# Patient Record
Sex: FEMALE | Race: WHITE | Employment: FULL TIME | URBAN - METROPOLITAN AREA
[De-identification: names, ages, dates, MRNs, and addresses within clinical notes are randomized per-mention and may not be internally consistent; named-entity substitution may affect disease eponyms.]

---

## 2017-01-18 ENCOUNTER — OFFICE VISIT (OUTPATIENT)
Dept: HEMATOLOGY/ONCOLOGY | Facility: HOSPITAL | Age: 57
End: 2017-01-18
Attending: OBSTETRICS & GYNECOLOGY
Payer: COMMERCIAL

## 2017-01-18 VITALS
HEART RATE: 81 BPM | WEIGHT: 231 LBS | RESPIRATION RATE: 20 BRPM | BODY MASS INDEX: 37.12 KG/M2 | TEMPERATURE: 98 F | SYSTOLIC BLOOD PRESSURE: 134 MMHG | DIASTOLIC BLOOD PRESSURE: 72 MMHG | HEIGHT: 65.98 IN

## 2017-01-18 DIAGNOSIS — C53.9 MALIGNANT NEOPLASM OF CERVIX, UNSPECIFIED SITE (HCC): Primary | ICD-10-CM

## 2017-01-18 PROCEDURE — 99211 OFF/OP EST MAY X REQ PHY/QHP: CPT

## 2017-01-18 NOTE — PROGRESS NOTES
CANCER CENTER PROGRESS NOTE     HISTORY OF PRESENT ILLNESS: The patient is a woman with stage IIB cervical carcinoma diagnosed 6/12.  She began receiving radiation with chemotherapy and did not tolerate the chemotherapy at all, only receiving 2 doses and ne

## 2017-12-20 ENCOUNTER — OFFICE VISIT (OUTPATIENT)
Dept: HEMATOLOGY/ONCOLOGY | Facility: HOSPITAL | Age: 57
End: 2017-12-20
Attending: OBSTETRICS & GYNECOLOGY
Payer: COMMERCIAL

## 2017-12-20 VITALS
HEART RATE: 73 BPM | RESPIRATION RATE: 18 BRPM | OXYGEN SATURATION: 94 % | TEMPERATURE: 98 F | WEIGHT: 230.81 LBS | DIASTOLIC BLOOD PRESSURE: 76 MMHG | HEIGHT: 66 IN | BODY MASS INDEX: 37.09 KG/M2 | SYSTOLIC BLOOD PRESSURE: 128 MMHG

## 2017-12-20 DIAGNOSIS — C53.9 MALIGNANT NEOPLASM OF CERVIX, UNSPECIFIED SITE (HCC): Primary | ICD-10-CM

## 2017-12-20 PROCEDURE — 99211 OFF/OP EST MAY X REQ PHY/QHP: CPT

## 2017-12-20 PROCEDURE — 88175 CYTOPATH C/V AUTO FLUID REDO: CPT | Performed by: OBSTETRICS & GYNECOLOGY

## 2018-07-19 ENCOUNTER — SOCIAL WORK SERVICES (OUTPATIENT)
Dept: HEMATOLOGY/ONCOLOGY | Facility: HOSPITAL | Age: 58
End: 2018-07-19

## 2018-07-19 NOTE — PROGRESS NOTES
AMANDA received call from patient's daughter, Zheng Troy, requesting letter for patient's insurance.    AMANDA prepared letter and emailed it to Jose Martinez at [email or web address].

## 2019-01-16 ENCOUNTER — APPOINTMENT (OUTPATIENT)
Dept: HEMATOLOGY/ONCOLOGY | Facility: HOSPITAL | Age: 59
End: 2019-01-16
Attending: OBSTETRICS & GYNECOLOGY
Payer: COMMERCIAL

## 2019-02-20 ENCOUNTER — OFFICE VISIT (OUTPATIENT)
Dept: HEMATOLOGY/ONCOLOGY | Facility: HOSPITAL | Age: 59
End: 2019-02-20
Attending: OBSTETRICS & GYNECOLOGY
Payer: COMMERCIAL

## 2019-02-20 VITALS
BODY MASS INDEX: 35.55 KG/M2 | DIASTOLIC BLOOD PRESSURE: 81 MMHG | SYSTOLIC BLOOD PRESSURE: 129 MMHG | OXYGEN SATURATION: 98 % | RESPIRATION RATE: 16 BRPM | HEART RATE: 77 BPM | WEIGHT: 221.19 LBS | TEMPERATURE: 98 F | HEIGHT: 66 IN

## 2019-02-20 DIAGNOSIS — C53.9 MALIGNANT NEOPLASM OF CERVIX, UNSPECIFIED SITE (HCC): Primary | ICD-10-CM

## 2019-02-20 PROCEDURE — 88175 CYTOPATH C/V AUTO FLUID REDO: CPT | Performed by: OBSTETRICS & GYNECOLOGY

## 2019-02-20 PROCEDURE — 99211 OFF/OP EST MAY X REQ PHY/QHP: CPT

## 2019-02-20 RX ORDER — ESTRADIOL 10 UG/1
INSERT VAGINAL
Qty: 20 TABLET | Refills: 2 | Status: SHIPPED | OUTPATIENT
Start: 2019-02-20 | End: 2019-08-21

## 2019-02-20 NOTE — PROGRESS NOTES
Patient is here for Dr Dorcas Vee one year follow up for cervical cancer. Patient c/o vaginal dryness and would like to discuss restarting Vagifem. Denies vaginal bleeding or discharge. Denies pain.        Education Record    Learner:  Patient    Disease / [place]

## 2019-02-27 ENCOUNTER — TELEPHONE (OUTPATIENT)
Dept: HEMATOLOGY/ONCOLOGY | Facility: HOSPITAL | Age: 59
End: 2019-02-27

## 2019-02-27 NOTE — TELEPHONE ENCOUNTER
Patient returned call and notified of pap smear results.  Scheduled f/u with Dr Naomi Meza in August.

## 2019-03-20 ENCOUNTER — HOSPITAL ENCOUNTER (EMERGENCY)
Age: 59
Discharge: HOME | End: 2019-03-20
Attending: EMERGENCY MEDICINE
Payer: COMMERCIAL

## 2019-03-20 VITALS
RESPIRATION RATE: 20 BRPM | DIASTOLIC BLOOD PRESSURE: 74 MMHG | SYSTOLIC BLOOD PRESSURE: 149 MMHG | HEIGHT: 66 IN | OXYGEN SATURATION: 96 % | WEIGHT: 198 LBS | TEMPERATURE: 100 F | BODY MASS INDEX: 31.82 KG/M2 | HEART RATE: 71 BPM

## 2019-03-20 DIAGNOSIS — J11.1 INFLUENZA: Primary | ICD-10-CM

## 2019-03-20 LAB
ALBUMIN SERPL-MCNC: 3.7 G/DL (ref 3.4–5)
ALBUMIN/GLOB SERPL: 1 {RATIO} (ref 1–2)
ALP LIVER SERPL-CCNC: 84 U/L (ref 46–118)
ALT SERPL-CCNC: 25 U/L (ref 13–56)
ANION GAP SERPL CALC-SCNC: 8 MMOL/L (ref 0–18)
AST SERPL-CCNC: 20 U/L (ref 15–37)
BASOPHILS # BLD AUTO: 0.01 X10(3) UL (ref 0–0.2)
BASOPHILS NFR BLD AUTO: 0.2 %
BILIRUB SERPL-MCNC: 0.8 MG/DL (ref 0.1–2)
BUN BLD-MCNC: 15 MG/DL (ref 7–18)
BUN/CREAT SERPL: 19 (ref 10–20)
CALCIUM BLD-MCNC: 8.7 MG/DL (ref 8.5–10.1)
CHLORIDE SERPL-SCNC: 103 MMOL/L (ref 98–107)
CLARITY UR REFRACT.AUTO: CLEAR
CO2 SERPL-SCNC: 26 MMOL/L (ref 21–32)
COLOR UR AUTO: YELLOW
CREAT BLD-MCNC: 0.79 MG/DL (ref 0.55–1.02)
DEPRECATED RDW RBC AUTO: 42.8 FL (ref 35.1–46.3)
EOSINOPHIL # BLD AUTO: 0 X10(3) UL (ref 0–0.7)
EOSINOPHIL NFR BLD AUTO: 0 %
ERYTHROCYTE [DISTWIDTH] IN BLOOD BY AUTOMATED COUNT: 13 % (ref 11–15)
GLOBULIN PLAS-MCNC: 3.8 G/DL (ref 2.8–4.4)
GLUCOSE BLD-MCNC: 124 MG/DL (ref 70–99)
GLUCOSE UR STRIP.AUTO-MCNC: NEGATIVE MG/DL
HCT VFR BLD AUTO: 43 % (ref 35–48)
HGB BLD-MCNC: 13.8 G/DL (ref 12–16)
IMM GRANULOCYTES # BLD AUTO: 0.02 X10(3) UL (ref 0–1)
IMM GRANULOCYTES NFR BLD: 0.3 %
KETONES UR STRIP.AUTO-MCNC: 80 MG/DL
LEUKOCYTE ESTERASE UR QL STRIP.AUTO: NEGATIVE
LYMPHOCYTES # BLD AUTO: 0.38 X10(3) UL (ref 1–4)
LYMPHOCYTES NFR BLD AUTO: 6.4 %
M PROTEIN MFR SERPL ELPH: 7.5 G/DL (ref 6.4–8.2)
MCH RBC QN AUTO: 29.1 PG (ref 26–34)
MCHC RBC AUTO-ENTMCNC: 32.1 G/DL (ref 31–37)
MCV RBC AUTO: 90.7 FL (ref 80–100)
MONOCYTES # BLD AUTO: 0.68 X10(3) UL (ref 0.1–1)
MONOCYTES NFR BLD AUTO: 11.5 %
NEUTROPHILS # BLD AUTO: 4.83 X10 (3) UL (ref 1.5–7.7)
NEUTROPHILS # BLD AUTO: 4.83 X10(3) UL (ref 1.5–7.7)
NEUTROPHILS NFR BLD AUTO: 81.6 %
NITRITE UR QL STRIP.AUTO: NEGATIVE
OSMOLALITY SERPL CALC.SUM OF ELEC: 286 MOSM/KG (ref 275–295)
PH UR STRIP.AUTO: 6 [PH] (ref 4.5–8)
PLATELET # BLD AUTO: 203 10(3)UL (ref 150–450)
POCT INFLUENZA A: POSITIVE
POCT INFLUENZA B: NEGATIVE
POTASSIUM SERPL-SCNC: 4.2 MMOL/L (ref 3.5–5.1)
RBC # BLD AUTO: 4.74 X10(6)UL (ref 3.8–5.3)
SODIUM SERPL-SCNC: 137 MMOL/L (ref 136–145)
SP GR UR STRIP.AUTO: >=1.03 (ref 1–1.03)
UROBILINOGEN UR STRIP.AUTO-MCNC: 0.2 MG/DL
WBC # BLD AUTO: 5.9 X10(3) UL (ref 4–11)

## 2019-03-20 PROCEDURE — 85025 COMPLETE CBC W/AUTO DIFF WBC: CPT | Performed by: EMERGENCY MEDICINE

## 2019-03-20 PROCEDURE — 96375 TX/PRO/DX INJ NEW DRUG ADDON: CPT | Performed by: EMERGENCY MEDICINE

## 2019-03-20 PROCEDURE — 99284 EMERGENCY DEPT VISIT MOD MDM: CPT | Performed by: EMERGENCY MEDICINE

## 2019-03-20 PROCEDURE — 96374 THER/PROPH/DIAG INJ IV PUSH: CPT | Performed by: EMERGENCY MEDICINE

## 2019-03-20 PROCEDURE — 87502 INFLUENZA DNA AMP PROBE: CPT | Performed by: EMERGENCY MEDICINE

## 2019-03-20 PROCEDURE — 96361 HYDRATE IV INFUSION ADD-ON: CPT | Performed by: EMERGENCY MEDICINE

## 2019-03-20 PROCEDURE — 81001 URINALYSIS AUTO W/SCOPE: CPT | Performed by: EMERGENCY MEDICINE

## 2019-03-20 PROCEDURE — 80053 COMPREHEN METABOLIC PANEL: CPT | Performed by: EMERGENCY MEDICINE

## 2019-03-20 RX ORDER — ESOMEPRAZOLE MAGNESIUM 40 MG/1
40 CAPSULE, DELAYED RELEASE ORAL
COMMUNITY

## 2019-03-20 RX ORDER — ONDANSETRON 2 MG/ML
4 INJECTION INTRAMUSCULAR; INTRAVENOUS ONCE
Status: COMPLETED | OUTPATIENT
Start: 2019-03-20 | End: 2019-03-20

## 2019-03-20 RX ORDER — ACETAMINOPHEN 160 MG/5ML
1000 SOLUTION ORAL ONCE
Status: COMPLETED | OUTPATIENT
Start: 2019-03-20 | End: 2019-03-20

## 2019-03-20 RX ORDER — ONDANSETRON 4 MG/1
4 TABLET, ORALLY DISINTEGRATING ORAL EVERY 4 HOURS PRN
Qty: 10 TABLET | Refills: 0 | Status: SHIPPED | OUTPATIENT
Start: 2019-03-20 | End: 2019-03-27

## 2019-03-20 NOTE — ED INITIAL ASSESSMENT (HPI)
Pt c/o cough/bodyaches/headache since Monday. Sts she had a few episodes of vomiting on Monday and once yesterday and 2 episodes of diarrhea. Denies fever.

## 2019-03-20 NOTE — ED PROVIDER NOTES
Patient Seen in: THE [facility] Emergency Department In [place]    History   Patient presents with:  Nausea/Vomiting/Diarrhea (gastrointestinal)  Abdomen/Flank Pain (GI/)  Headache (neurologic)    Stated Complaint: [facility] Physical Exam    Vital signs reviewed  General appearance: Patient is alert and in no acute distress  HEENT: Pupils equal react to light extraocular muscles intact no scleral icterus, mucous membranes are moist, there is no erythema or exudate in t Motrin Tylenol and drink plenty of fluids. Make sure limiting exposure to other individuals. Return if any worsening problem. Instructed next year to make sure he gets the flu shot. Also told patient antibiotics would be of no use for this.   Patient is

## 2019-08-21 ENCOUNTER — OFFICE VISIT (OUTPATIENT)
Dept: HEMATOLOGY/ONCOLOGY | Facility: HOSPITAL | Age: 59
End: 2019-08-21
Attending: OBSTETRICS & GYNECOLOGY
Payer: COMMERCIAL

## 2019-08-21 VITALS
WEIGHT: 221.81 LBS | HEART RATE: 79 BPM | HEIGHT: 65.98 IN | DIASTOLIC BLOOD PRESSURE: 82 MMHG | SYSTOLIC BLOOD PRESSURE: 139 MMHG | TEMPERATURE: 99 F | RESPIRATION RATE: 16 BRPM | BODY MASS INDEX: 35.65 KG/M2 | OXYGEN SATURATION: 95 %

## 2019-08-21 DIAGNOSIS — C53.9 MALIGNANT NEOPLASM OF CERVIX, UNSPECIFIED SITE (HCC): Primary | ICD-10-CM

## 2019-08-21 PROCEDURE — 88175 CYTOPATH C/V AUTO FLUID REDO: CPT | Performed by: OBSTETRICS & GYNECOLOGY

## 2019-08-21 PROCEDURE — 87624 HPV HI-RISK TYP POOLED RSLT: CPT | Performed by: OBSTETRICS & GYNECOLOGY

## 2019-08-21 RX ORDER — ESTRADIOL 10 UG/1
INSERT VAGINAL
Qty: 20 TABLET | Refills: 3 | Status: SHIPPED | OUTPATIENT
Start: 2019-08-21 | End: 2021-12-22

## 2019-08-21 NOTE — PROGRESS NOTES
Patient is here for Dr Deniz Rinaldi 6 month f/u for cervical cancer. Patient is feeling well. Denies any vaginal bleeding or discharge. No complaints.        Education Record    Learner:  Patient    Disease / Diagnosis:  Cervical cancer     Barriers / Limitations

## 2019-08-26 LAB — HPV I/H RISK 1 DNA SPEC QL NAA+PROBE: NEGATIVE

## 2019-09-09 ENCOUNTER — APPOINTMENT (OUTPATIENT)
Dept: GENERAL RADIOLOGY | Age: 59
End: 2019-09-09
Attending: FAMILY MEDICINE
Payer: COMMERCIAL

## 2019-09-09 ENCOUNTER — HOSPITAL ENCOUNTER (OUTPATIENT)
Age: 59
Discharge: HOME | End: 2019-09-09
Attending: FAMILY MEDICINE
Payer: COMMERCIAL

## 2019-09-09 VITALS
TEMPERATURE: 99 F | DIASTOLIC BLOOD PRESSURE: 83 MMHG | SYSTOLIC BLOOD PRESSURE: 147 MMHG | RESPIRATION RATE: 18 BRPM | OXYGEN SATURATION: 97 % | HEART RATE: 88 BPM

## 2019-09-09 DIAGNOSIS — S63.501A SPRAIN OF RIGHT WRIST, INITIAL ENCOUNTER: ICD-10-CM

## 2019-09-09 DIAGNOSIS — S60.00XA CONTUSION OF FINGER WITHOUT DAMAGE TO NAIL, UNSPECIFIED FINGER, INITIAL ENCOUNTER: Primary | ICD-10-CM

## 2019-09-09 PROCEDURE — 99202 OFFICE O/P NEW SF 15 MIN: CPT

## 2019-09-09 PROCEDURE — 73130 X-RAY EXAM OF HAND: CPT | Performed by: FAMILY MEDICINE

## 2019-09-09 PROCEDURE — 73110 X-RAY EXAM OF WRIST: CPT | Performed by: FAMILY MEDICINE

## 2019-09-09 PROCEDURE — 99213 OFFICE O/P EST LOW 20 MIN: CPT

## 2019-09-10 ENCOUNTER — TELEPHONE (OUTPATIENT)
Dept: HEMATOLOGY/ONCOLOGY | Facility: HOSPITAL | Age: 59
End: 2019-09-10

## 2019-09-10 NOTE — ED INITIAL ASSESSMENT (HPI)
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

## 2019-09-10 NOTE — ED PROVIDER NOTES
Patient Seen in: [facility]    History   Patient presents with:  Upper Extremity Injury (musculoskeletal)    Stated Complaint: FALL AT WORK--WRIST/HAND PAIN     HPI    78-year-old female presents today for evaluation of right wr in no acute distress   examination of the right wrist there is no focal tenderness to the wrist normal range of motion. There is mild bruising, tenderness to the proximal fourth digit otherwise full range of motion good handgrip.   Intact sensations, stren with a right wrist sprain. X-ray shows no acute fracture or dislocation. Conservative management. Dale taping applied for comfort measures. Ibuprofen for pain. Follow PCP accordingly.               Disposition and Plan     Clinical Impression:  Contus

## 2019-10-19 ENCOUNTER — HOSPITAL ENCOUNTER (OUTPATIENT)
Age: 59
Discharge: HOME | End: 2019-10-19
Payer: COMMERCIAL

## 2019-10-19 VITALS
HEIGHT: 66 IN | DIASTOLIC BLOOD PRESSURE: 66 MMHG | SYSTOLIC BLOOD PRESSURE: 137 MMHG | OXYGEN SATURATION: 98 % | RESPIRATION RATE: 16 BRPM | WEIGHT: 190 LBS | TEMPERATURE: 98 F | BODY MASS INDEX: 30.53 KG/M2 | HEART RATE: 61 BPM

## 2019-10-19 DIAGNOSIS — R11.2 NAUSEA VOMITING AND DIARRHEA: Primary | ICD-10-CM

## 2019-10-19 DIAGNOSIS — E86.0 DEHYDRATION: ICD-10-CM

## 2019-10-19 DIAGNOSIS — R19.7 NAUSEA VOMITING AND DIARRHEA: Primary | ICD-10-CM

## 2019-10-19 PROCEDURE — 80047 BASIC METABLC PNL IONIZED CA: CPT

## 2019-10-19 PROCEDURE — 96361 HYDRATE IV INFUSION ADD-ON: CPT

## 2019-10-19 PROCEDURE — 96374 THER/PROPH/DIAG INJ IV PUSH: CPT

## 2019-10-19 PROCEDURE — 81002 URINALYSIS NONAUTO W/O SCOPE: CPT | Performed by: PHYSICIAN ASSISTANT

## 2019-10-19 PROCEDURE — 96375 TX/PRO/DX INJ NEW DRUG ADDON: CPT

## 2019-10-19 PROCEDURE — 99214 OFFICE O/P EST MOD 30 MIN: CPT

## 2019-10-19 PROCEDURE — 99215 OFFICE O/P EST HI 40 MIN: CPT

## 2019-10-19 PROCEDURE — 85025 COMPLETE CBC W/AUTO DIFF WBC: CPT | Performed by: PHYSICIAN ASSISTANT

## 2019-10-19 RX ORDER — KETOROLAC TROMETHAMINE 30 MG/ML
15 INJECTION, SOLUTION INTRAMUSCULAR; INTRAVENOUS ONCE
Status: COMPLETED | OUTPATIENT
Start: 2019-10-19 | End: 2019-10-19

## 2019-10-19 RX ORDER — SODIUM CHLORIDE 9 MG/ML
1000 INJECTION, SOLUTION INTRAVENOUS ONCE
Status: COMPLETED | OUTPATIENT
Start: 2019-10-19 | End: 2019-10-19

## 2019-10-19 RX ORDER — ONDANSETRON 2 MG/ML
4 INJECTION INTRAMUSCULAR; INTRAVENOUS ONCE
Status: COMPLETED | OUTPATIENT
Start: 2019-10-19 | End: 2019-10-19

## 2019-10-19 NOTE — ED PROVIDER NOTES
Patient Seen in: [facility]      History   Patient presents with:  Nausea/Vomiting/Diarrhea (gastrointestinal)    Stated Complaint: FLU LIKE SYMPTOMS    HPI    CHIEF COMPLAINT: Nausea vomiting, fevers, chills, flulike symptoms reviewed and is noncontributory to the presenting problem, except as indicated as above.     Past Medical History:   Diagnosis Date   • Anxiety state, unspecified    • Cancer of cervix ([facility] [address]    • Esophageal reflux               Past Surgical History:   Proc tenderness. Nose: Inferior turbinates are swollen, boggy with clear rhinorrhea. No foreign bodies or polyps. Respiratory: there are no retractions, lungs are clear to auscultation bilaterally.   Cardiovascular: regular rate and rhythm, normal S1-S2  Abdo diagnosis and need for followup with their PCP in 2 days for further evaluation and care, but to return immediately to the ER for worsening, concerning, new, changing or persisting symptoms. Patient was screened and evaluated during this visit.    I dete

## 2019-10-19 NOTE — ED INITIAL ASSESSMENT (HPI)
Pt has had nausea vomiting and diarrhea with a headache and body aches for 3 days, after getting her flu shot

## 2020-01-15 ENCOUNTER — OFFICE VISIT (OUTPATIENT)
Dept: HEMATOLOGY/ONCOLOGY | Facility: HOSPITAL | Age: 60
End: 2020-01-15
Attending: OBSTETRICS & GYNECOLOGY
Payer: COMMERCIAL

## 2020-01-15 VITALS
RESPIRATION RATE: 16 BRPM | DIASTOLIC BLOOD PRESSURE: 81 MMHG | SYSTOLIC BLOOD PRESSURE: 136 MMHG | WEIGHT: 219 LBS | HEIGHT: 65.98 IN | HEART RATE: 73 BPM | BODY MASS INDEX: 35.2 KG/M2 | OXYGEN SATURATION: 98 % | TEMPERATURE: 98 F

## 2020-01-15 DIAGNOSIS — C53.9 MALIGNANT NEOPLASM OF CERVIX, UNSPECIFIED SITE (HCC): Primary | ICD-10-CM

## 2020-01-15 PROCEDURE — 88175 CYTOPATH C/V AUTO FLUID REDO: CPT | Performed by: OBSTETRICS & GYNECOLOGY

## 2020-01-15 PROCEDURE — 99211 OFF/OP EST MAY X REQ PHY/QHP: CPT

## 2020-01-15 NOTE — PROGRESS NOTES
Patient is here for Dr Owen Schaffer f/u for cervical cancer. She is feeling well. Denies vaginal discharge or bleeding. No complaints.        Education Record    Learner:  Patient    Disease / Diagnosis:  Cervical cancer     Barriers / Limitations:  None   Commen

## 2020-01-21 ENCOUNTER — TELEPHONE (OUTPATIENT)
Dept: HEMATOLOGY/ONCOLOGY | Facility: HOSPITAL | Age: 60
End: 2020-01-21

## 2020-01-21 NOTE — TELEPHONE ENCOUNTER
Test(s) completed: Pap smear   Results: per Dr Tripp Ramos, repeat in 6 months. Plan: called patient, no answer. Left message to repeat in 6 months.

## 2020-02-19 ENCOUNTER — APPOINTMENT (OUTPATIENT)
Dept: HEMATOLOGY/ONCOLOGY | Facility: HOSPITAL | Age: 60
End: 2020-02-19
Attending: OBSTETRICS & GYNECOLOGY
Payer: COMMERCIAL

## 2020-06-09 NOTE — PROGRESS NOTES
CANCER CENTER PROGRESS NOTE     HISTORY OF PRESENT ILLNESS: The patient is a woman with stage IIB cervical carcinoma diagnosed 6/12.  She began receiving radiation with chemotherapy and did not tolerate the chemotherapy at all, only receiving 2 doses and ne no

## 2020-12-16 ENCOUNTER — OFFICE VISIT (OUTPATIENT)
Dept: HEMATOLOGY/ONCOLOGY | Facility: HOSPITAL | Age: 60
End: 2020-12-16
Attending: OBSTETRICS & GYNECOLOGY
Payer: COMMERCIAL

## 2020-12-16 VITALS
SYSTOLIC BLOOD PRESSURE: 118 MMHG | DIASTOLIC BLOOD PRESSURE: 72 MMHG | HEART RATE: 72 BPM | WEIGHT: 224.19 LBS | RESPIRATION RATE: 16 BRPM | BODY MASS INDEX: 36 KG/M2 | OXYGEN SATURATION: 96 % | TEMPERATURE: 98 F

## 2020-12-16 DIAGNOSIS — C53.9 MALIGNANT NEOPLASM OF CERVIX, UNSPECIFIED SITE (HCC): Primary | ICD-10-CM

## 2020-12-16 PROCEDURE — 87624 HPV HI-RISK TYP POOLED RSLT: CPT | Performed by: OBSTETRICS & GYNECOLOGY

## 2020-12-16 PROCEDURE — 88175 CYTOPATH C/V AUTO FLUID REDO: CPT | Performed by: OBSTETRICS & GYNECOLOGY

## 2020-12-16 PROCEDURE — 99211 OFF/OP EST MAY X REQ PHY/QHP: CPT

## 2020-12-16 NOTE — PROGRESS NOTES
Patient is here for Dr Farhat Gonzales one year follow up for cervical cancer. Patient denies any vaginal bleeding or discharge. Feeling well. Denies any complaints.        Education Record    Learner:  Patient    Disease / Diagnosis:  Cervical cancer     Barriers /

## 2021-12-22 ENCOUNTER — OFFICE VISIT (OUTPATIENT)
Dept: HEMATOLOGY/ONCOLOGY | Facility: HOSPITAL | Age: 61
End: 2021-12-22
Attending: OBSTETRICS & GYNECOLOGY
Payer: COMMERCIAL

## 2021-12-22 VITALS
HEART RATE: 76 BPM | OXYGEN SATURATION: 97 % | WEIGHT: 226 LBS | RESPIRATION RATE: 16 BRPM | SYSTOLIC BLOOD PRESSURE: 122 MMHG | DIASTOLIC BLOOD PRESSURE: 80 MMHG | BODY MASS INDEX: 37 KG/M2

## 2021-12-22 DIAGNOSIS — C53.9 MALIGNANT NEOPLASM OF CERVIX, UNSPECIFIED SITE (HCC): Primary | ICD-10-CM

## 2021-12-22 PROCEDURE — 99211 OFF/OP EST MAY X REQ PHY/QHP: CPT

## 2021-12-22 PROCEDURE — 88175 CYTOPATH C/V AUTO FLUID REDO: CPT | Performed by: OBSTETRICS & GYNECOLOGY

## 2021-12-22 RX ORDER — ESTRADIOL 10 UG/1
INSERT VAGINAL
Qty: 24 TABLET | Refills: 3 | Status: SHIPPED | OUTPATIENT
Start: 2021-12-22

## 2021-12-22 NOTE — PROGRESS NOTES
Patient is here for MD one year follow up for Cervical Cancer. Feeling well. No vaginal discharge or bleeding. Needs Vagifem refill today.        Education Record    Learner:  Patient    Disease / Diagnosis: Cervical cancer     Barriers / Limitations:  None

## 2022-01-04 ENCOUNTER — TELEPHONE (OUTPATIENT)
Dept: HEMATOLOGY/ONCOLOGY | Facility: HOSPITAL | Age: 62
End: 2022-01-04

## 2022-01-04 NOTE — TELEPHONE ENCOUNTER
Left patient a voicemail message, letting her know pap smear was normal. Also informed her of Dr Anita Nelson message stating \"She called requesting generic rx for the vagifem,  nothing cheaper than that\"    Asked patient to call back with questions or david

## 2022-07-04 ENCOUNTER — HOSPITAL ENCOUNTER (EMERGENCY)
Age: 62
Discharge: HOME | End: 2022-07-05
Attending: EMERGENCY MEDICINE
Payer: COMMERCIAL

## 2022-07-04 DIAGNOSIS — U07.1 COVID-19: Primary | ICD-10-CM

## 2022-07-04 DIAGNOSIS — R11.2 NAUSEA AND VOMITING IN ADULT: ICD-10-CM

## 2022-07-04 LAB
ALBUMIN SERPL-MCNC: 3.9 G/DL (ref 3.4–5)
ALBUMIN/GLOB SERPL: 1.1 {RATIO} (ref 1–2)
ALP LIVER SERPL-CCNC: 93 U/L
ALT SERPL-CCNC: 59 U/L
ANION GAP SERPL CALC-SCNC: 9 MMOL/L (ref 0–18)
AST SERPL-CCNC: 43 U/L (ref 15–37)
BASOPHILS # BLD: 0 X10(3) UL (ref 0–0.2)
BASOPHILS NFR BLD: 0 %
BILIRUB SERPL-MCNC: 0.6 MG/DL (ref 0.1–2)
BUN BLD-MCNC: 14 MG/DL (ref 7–18)
CALCIUM BLD-MCNC: 9.2 MG/DL (ref 8.5–10.1)
CHLORIDE SERPL-SCNC: 105 MMOL/L (ref 98–112)
CO2 SERPL-SCNC: 22 MMOL/L (ref 21–32)
CREAT BLD-MCNC: 0.67 MG/DL
EOSINOPHIL # BLD: 0 X10(3) UL (ref 0–0.7)
EOSINOPHIL NFR BLD: 0 %
ERYTHROCYTE [DISTWIDTH] IN BLOOD BY AUTOMATED COUNT: 13.2 %
GLOBULIN PLAS-MCNC: 3.6 G/DL (ref 2.8–4.4)
GLUCOSE BLD-MCNC: 131 MG/DL (ref 70–99)
HCT VFR BLD AUTO: 43.5 %
HGB BLD-MCNC: 14.2 G/DL
LYMPHOCYTES NFR BLD: 0.58 X10(3) UL (ref 1–4)
LYMPHOCYTES NFR BLD: 11 %
MCH RBC QN AUTO: 29.8 PG (ref 26–34)
MCHC RBC AUTO-ENTMCNC: 32.6 G/DL (ref 31–37)
MCV RBC AUTO: 91.4 FL
MONOCYTES # BLD: 0.32 X10(3) UL (ref 0.1–1)
MONOCYTES NFR BLD: 6 %
MORPHOLOGY: NORMAL
NEUTROPHILS # BLD AUTO: 3.05 X10 (3) UL (ref 1.5–7.7)
NEUTROPHILS NFR BLD: 83 %
NEUTS HYPERSEG # BLD: 4.4 X10(3) UL (ref 1.5–7.7)
OSMOLALITY SERPL CALC.SUM OF ELEC: 284 MOSM/KG (ref 275–295)
PLATELET # BLD AUTO: 171 10(3)UL (ref 150–450)
PLATELET MORPHOLOGY: NORMAL
POTASSIUM SERPL-SCNC: 3.5 MMOL/L (ref 3.5–5.1)
PROT SERPL-MCNC: 7.5 G/DL (ref 6.4–8.2)
RBC # BLD AUTO: 4.76 X10(6)UL
SARS-COV-2 RNA RESP QL NAA+PROBE: DETECTED
SODIUM SERPL-SCNC: 136 MMOL/L (ref 136–145)
TOTAL CELLS COUNTED BLD: 100
WBC # BLD AUTO: 5.3 X10(3) UL (ref 4–11)

## 2022-07-04 PROCEDURE — 85025 COMPLETE CBC W/AUTO DIFF WBC: CPT | Performed by: EMERGENCY MEDICINE

## 2022-07-04 PROCEDURE — 96374 THER/PROPH/DIAG INJ IV PUSH: CPT

## 2022-07-04 PROCEDURE — 93005 ELECTROCARDIOGRAM TRACING: CPT

## 2022-07-04 PROCEDURE — 99284 EMERGENCY DEPT VISIT MOD MDM: CPT

## 2022-07-04 PROCEDURE — 85007 BL SMEAR W/DIFF WBC COUNT: CPT | Performed by: EMERGENCY MEDICINE

## 2022-07-04 PROCEDURE — 93010 ELECTROCARDIOGRAM REPORT: CPT

## 2022-07-04 PROCEDURE — 96361 HYDRATE IV INFUSION ADD-ON: CPT

## 2022-07-04 PROCEDURE — 85027 COMPLETE CBC AUTOMATED: CPT | Performed by: EMERGENCY MEDICINE

## 2022-07-04 PROCEDURE — 96375 TX/PRO/DX INJ NEW DRUG ADDON: CPT

## 2022-07-04 PROCEDURE — 80053 COMPREHEN METABOLIC PANEL: CPT | Performed by: EMERGENCY MEDICINE

## 2022-07-04 RX ORDER — METOCLOPRAMIDE HYDROCHLORIDE 5 MG/ML
5 INJECTION INTRAMUSCULAR; INTRAVENOUS ONCE
Status: COMPLETED | OUTPATIENT
Start: 2022-07-04 | End: 2022-07-05

## 2022-07-04 RX ORDER — ONDANSETRON 2 MG/ML
4 INJECTION INTRAMUSCULAR; INTRAVENOUS ONCE
Status: COMPLETED | OUTPATIENT
Start: 2022-07-04 | End: 2022-07-04

## 2022-07-04 RX ORDER — SODIUM CHLORIDE 9 MG/ML
INJECTION, SOLUTION INTRAVENOUS CONTINUOUS
Status: DISCONTINUED | OUTPATIENT
Start: 2022-07-04 | End: 2022-07-05

## 2022-07-04 RX ORDER — KETOROLAC TROMETHAMINE 15 MG/ML
15 INJECTION, SOLUTION INTRAMUSCULAR; INTRAVENOUS ONCE
Status: COMPLETED | OUTPATIENT
Start: 2022-07-04 | End: 2022-07-04

## 2022-07-04 RX ORDER — DIPHENHYDRAMINE HYDROCHLORIDE 50 MG/ML
25 INJECTION INTRAMUSCULAR; INTRAVENOUS ONCE
Status: DISCONTINUED | OUTPATIENT
Start: 2022-07-04 | End: 2022-07-05

## 2022-07-05 VITALS
BODY MASS INDEX: 29 KG/M2 | HEART RATE: 82 BPM | DIASTOLIC BLOOD PRESSURE: 79 MMHG | TEMPERATURE: 100 F | RESPIRATION RATE: 18 BRPM | WEIGHT: 179.88 LBS | OXYGEN SATURATION: 96 % | SYSTOLIC BLOOD PRESSURE: 152 MMHG

## 2022-07-05 VITALS
BODY MASS INDEX: 28.93 KG/M2 | TEMPERATURE: 99 F | DIASTOLIC BLOOD PRESSURE: 83 MMHG | SYSTOLIC BLOOD PRESSURE: 144 MMHG | OXYGEN SATURATION: 97 % | RESPIRATION RATE: 16 BRPM | HEIGHT: 66 IN | WEIGHT: 180 LBS | HEART RATE: 76 BPM

## 2022-07-05 DIAGNOSIS — R11.2 NAUSEA VOMITING AND DIARRHEA: Primary | ICD-10-CM

## 2022-07-05 DIAGNOSIS — R19.7 NAUSEA VOMITING AND DIARRHEA: Primary | ICD-10-CM

## 2022-07-05 DIAGNOSIS — F41.9 ANXIETY: ICD-10-CM

## 2022-07-05 LAB
ALBUMIN SERPL-MCNC: 3.4 G/DL (ref 3.4–5)
ALBUMIN/GLOB SERPL: 1.1 {RATIO} (ref 1–2)
ALP LIVER SERPL-CCNC: 88 U/L
ALT SERPL-CCNC: 67 U/L
ANION GAP SERPL CALC-SCNC: 8 MMOL/L (ref 0–18)
AST SERPL-CCNC: 50 U/L (ref 15–37)
ATRIAL RATE: 80 BPM
BASOPHILS # BLD AUTO: 0.01 X10(3) UL (ref 0–0.2)
BASOPHILS NFR BLD AUTO: 0.2 %
BILIRUB SERPL-MCNC: 0.5 MG/DL (ref 0.1–2)
BUN BLD-MCNC: 10 MG/DL (ref 7–18)
CALCIUM BLD-MCNC: 8.7 MG/DL (ref 8.5–10.1)
CHLORIDE SERPL-SCNC: 105 MMOL/L (ref 98–112)
CO2 SERPL-SCNC: 25 MMOL/L (ref 21–32)
CREAT BLD-MCNC: 0.66 MG/DL
EOSINOPHIL # BLD AUTO: 0 X10(3) UL (ref 0–0.7)
EOSINOPHIL NFR BLD AUTO: 0 %
ERYTHROCYTE [DISTWIDTH] IN BLOOD BY AUTOMATED COUNT: 13.3 %
GLOBULIN PLAS-MCNC: 3.2 G/DL (ref 2.8–4.4)
GLUCOSE BLD-MCNC: 123 MG/DL (ref 70–99)
HCT VFR BLD AUTO: 39.9 %
HGB BLD-MCNC: 12.8 G/DL
IMM GRANULOCYTES # BLD AUTO: 0.01 X10(3) UL (ref 0–1)
IMM GRANULOCYTES NFR BLD: 0.2 %
LYMPHOCYTES # BLD AUTO: 0.46 X10(3) UL (ref 1–4)
LYMPHOCYTES NFR BLD AUTO: 9.8 %
MCH RBC QN AUTO: 29.7 PG (ref 26–34)
MCHC RBC AUTO-ENTMCNC: 32.1 G/DL (ref 31–37)
MCV RBC AUTO: 92.6 FL
MONOCYTES # BLD AUTO: 0.57 X10(3) UL (ref 0.1–1)
MONOCYTES NFR BLD AUTO: 12.1 %
NEUTROPHILS # BLD AUTO: 3.65 X10 (3) UL (ref 1.5–7.7)
NEUTROPHILS # BLD AUTO: 3.65 X10(3) UL (ref 1.5–7.7)
NEUTROPHILS NFR BLD AUTO: 77.7 %
OSMOLALITY SERPL CALC.SUM OF ELEC: 286 MOSM/KG (ref 275–295)
P AXIS: 42 DEGREES
P-R INTERVAL: 188 MS
PLATELET # BLD AUTO: 160 10(3)UL (ref 150–450)
POTASSIUM SERPL-SCNC: 3.3 MMOL/L (ref 3.5–5.1)
PROT SERPL-MCNC: 6.6 G/DL (ref 6.4–8.2)
Q-T INTERVAL: 366 MS
QRS DURATION: 86 MS
QTC CALCULATION (BEZET): 422 MS
R AXIS: -33 DEGREES
RBC # BLD AUTO: 4.31 X10(6)UL
SODIUM SERPL-SCNC: 138 MMOL/L (ref 136–145)
T AXIS: 6 DEGREES
VENTRICULAR RATE: 80 BPM
WBC # BLD AUTO: 4.7 X10(3) UL (ref 4–11)

## 2022-07-05 PROCEDURE — 96375 TX/PRO/DX INJ NEW DRUG ADDON: CPT

## 2022-07-05 PROCEDURE — 96374 THER/PROPH/DIAG INJ IV PUSH: CPT

## 2022-07-05 PROCEDURE — 99284 EMERGENCY DEPT VISIT MOD MDM: CPT

## 2022-07-05 PROCEDURE — 80053 COMPREHEN METABOLIC PANEL: CPT | Performed by: EMERGENCY MEDICINE

## 2022-07-05 PROCEDURE — 96361 HYDRATE IV INFUSION ADD-ON: CPT

## 2022-07-05 PROCEDURE — 85025 COMPLETE CBC W/AUTO DIFF WBC: CPT | Performed by: EMERGENCY MEDICINE

## 2022-07-05 RX ORDER — DEXAMETHASONE SODIUM PHOSPHATE 10 MG/ML
10 INJECTION, SOLUTION INTRAMUSCULAR; INTRAVENOUS ONCE
Status: COMPLETED | OUTPATIENT
Start: 2022-07-05 | End: 2022-07-05

## 2022-07-05 RX ORDER — LORAZEPAM 2 MG/ML
1 INJECTION INTRAMUSCULAR ONCE
Status: COMPLETED | OUTPATIENT
Start: 2022-07-05 | End: 2022-07-05

## 2022-07-05 RX ORDER — LORAZEPAM 0.5 MG/1
0.5 TABLET ORAL 2 TIMES DAILY PRN
Qty: 20 TABLET | Refills: 0 | Status: SHIPPED | OUTPATIENT
Start: 2022-07-05 | End: 2022-07-12

## 2022-07-05 RX ORDER — METOCLOPRAMIDE 10 MG/1
10 TABLET ORAL 3 TIMES DAILY PRN
Qty: 20 TABLET | Refills: 0 | Status: SHIPPED | OUTPATIENT
Start: 2022-07-05 | End: 2022-08-04

## 2022-07-05 RX ORDER — ACETAMINOPHEN AND CODEINE PHOSPHATE 300; 30 MG/1; MG/1
2 TABLET ORAL ONCE
Status: COMPLETED | OUTPATIENT
Start: 2022-07-05 | End: 2022-07-05

## 2022-07-05 RX ORDER — ONDANSETRON 2 MG/ML
4 INJECTION INTRAMUSCULAR; INTRAVENOUS ONCE
Status: COMPLETED | OUTPATIENT
Start: 2022-07-05 | End: 2022-07-05

## 2022-07-05 RX ORDER — DIPHENHYDRAMINE HYDROCHLORIDE 50 MG/ML
25 INJECTION INTRAMUSCULAR; INTRAVENOUS ONCE
Status: COMPLETED | OUTPATIENT
Start: 2022-07-05 | End: 2022-07-05

## 2022-07-05 RX ORDER — ONDANSETRON 4 MG/1
4 TABLET, ORALLY DISINTEGRATING ORAL EVERY 4 HOURS PRN
Qty: 10 TABLET | Refills: 0 | Status: SHIPPED | OUTPATIENT
Start: 2022-07-05 | End: 2022-07-12

## 2022-07-05 RX ORDER — METOCLOPRAMIDE HYDROCHLORIDE 5 MG/ML
10 INJECTION INTRAMUSCULAR; INTRAVENOUS ONCE
Status: COMPLETED | OUTPATIENT
Start: 2022-07-05 | End: 2022-07-05

## 2022-07-28 ENCOUNTER — OFFICE VISIT (OUTPATIENT)
Dept: FAMILY MEDICINE CLINIC | Facility: CLINIC | Age: 62
End: 2022-07-28
Payer: COMMERCIAL

## 2022-07-28 VITALS
HEART RATE: 74 BPM | SYSTOLIC BLOOD PRESSURE: 142 MMHG | BODY MASS INDEX: 36 KG/M2 | DIASTOLIC BLOOD PRESSURE: 90 MMHG | OXYGEN SATURATION: 98 % | HEIGHT: 66 IN | WEIGHT: 224 LBS | TEMPERATURE: 98 F | RESPIRATION RATE: 16 BRPM

## 2022-07-28 DIAGNOSIS — E55.9 VITAMIN D DEFICIENCY: ICD-10-CM

## 2022-07-28 DIAGNOSIS — F51.04 PSYCHOPHYSIOLOGICAL INSOMNIA: ICD-10-CM

## 2022-07-28 DIAGNOSIS — Z00.00 ANNUAL PHYSICAL EXAM: Primary | ICD-10-CM

## 2022-07-28 DIAGNOSIS — E78.00 PURE HYPERCHOLESTEROLEMIA: ICD-10-CM

## 2022-07-28 DIAGNOSIS — Z13.31 NEGATIVE DEPRESSION SCREENING: ICD-10-CM

## 2022-07-28 DIAGNOSIS — F41.9 ANXIETY: ICD-10-CM

## 2022-07-28 PROCEDURE — 99386 PREV VISIT NEW AGE 40-64: CPT | Performed by: FAMILY MEDICINE

## 2022-07-28 PROCEDURE — 99213 OFFICE O/P EST LOW 20 MIN: CPT | Performed by: FAMILY MEDICINE

## 2022-07-28 PROCEDURE — 3080F DIAST BP >= 90 MM HG: CPT | Performed by: FAMILY MEDICINE

## 2022-07-28 PROCEDURE — 3077F SYST BP >= 140 MM HG: CPT | Performed by: FAMILY MEDICINE

## 2022-07-28 PROCEDURE — 3008F BODY MASS INDEX DOCD: CPT | Performed by: FAMILY MEDICINE

## 2022-07-28 RX ORDER — ROSUVASTATIN CALCIUM 10 MG/1
10 TABLET, COATED ORAL NIGHTLY
Qty: 90 TABLET | Refills: 1 | Status: SHIPPED | OUTPATIENT
Start: 2022-07-28

## 2022-07-28 RX ORDER — SERTRALINE HYDROCHLORIDE 25 MG/1
TABLET, FILM COATED ORAL
COMMUNITY
Start: 2022-06-29

## 2022-07-28 RX ORDER — LORAZEPAM 1 MG/1
1 TABLET ORAL NIGHTLY PRN
Qty: 30 TABLET | Refills: 5 | Status: SHIPPED | OUTPATIENT
Start: 2022-07-28

## 2022-07-29 PROBLEM — E66.9 OBESITY: Status: ACTIVE | Noted: 2022-07-29

## 2022-07-29 PROBLEM — F51.01 PRIMARY INSOMNIA: Status: ACTIVE | Noted: 2022-07-29

## 2022-08-04 DIAGNOSIS — F51.04 PSYCHOPHYSIOLOGICAL INSOMNIA: ICD-10-CM

## 2022-08-04 DIAGNOSIS — F41.9 ANXIETY: ICD-10-CM

## 2022-08-04 RX ORDER — LORAZEPAM 1 MG/1
1 TABLET ORAL NIGHTLY PRN
Qty: 30 TABLET | Refills: 5 | OUTPATIENT
Start: 2022-08-04

## 2022-08-10 ENCOUNTER — PATIENT MESSAGE (OUTPATIENT)
Dept: FAMILY MEDICINE CLINIC | Facility: CLINIC | Age: 62
End: 2022-08-10

## 2022-08-10 NOTE — TELEPHONE ENCOUNTER
From: Bubba Borrero  To:  Kelly Rodriguez DO  Sent: 8/10/2022 8:26 AM CDT  Subject: Zoloft    Hi i need refill for my zoloft i had a few weeks left when i saw you but now i have one more day thank you

## 2022-12-21 ENCOUNTER — OFFICE VISIT (OUTPATIENT)
Dept: HEMATOLOGY/ONCOLOGY | Facility: HOSPITAL | Age: 62
End: 2022-12-21
Attending: OBSTETRICS & GYNECOLOGY
Payer: COMMERCIAL

## 2022-12-21 VITALS
BODY MASS INDEX: 37 KG/M2 | SYSTOLIC BLOOD PRESSURE: 141 MMHG | RESPIRATION RATE: 18 BRPM | OXYGEN SATURATION: 98 % | DIASTOLIC BLOOD PRESSURE: 84 MMHG | HEART RATE: 74 BPM | TEMPERATURE: 98 F | WEIGHT: 229 LBS

## 2022-12-21 DIAGNOSIS — C53.9 MALIGNANT NEOPLASM OF CERVIX, UNSPECIFIED SITE (HCC): Primary | ICD-10-CM

## 2022-12-21 PROCEDURE — 99211 OFF/OP EST MAY X REQ PHY/QHP: CPT

## 2022-12-21 NOTE — PROGRESS NOTES
Patient is here for 1 year Cervical cancer follow up. Patient denies any vaginal bleeding, pain or discharge. Chronic vaginal dryness. She stopped using Vagifem as she felt it wasn't working. Currently using vitamin E suppository.        Education Record    Learner:  Patient    Disease / Diagnosis:  Cervical cancer     Barriers / Limitations:  None   Comments:    Method:  Discussion   Comments:    General Topics:  Plan of care reviewed   Comments:    Outcome:  Shows understanding   Comments:

## 2023-01-05 LAB — HPV I/H RISK 1 DNA SPEC QL NAA+PROBE: NEGATIVE

## 2023-02-08 ENCOUNTER — PATIENT MESSAGE (OUTPATIENT)
Dept: FAMILY MEDICINE CLINIC | Facility: CLINIC | Age: 63
End: 2023-02-08

## 2023-02-08 DIAGNOSIS — F41.9 ANXIETY: ICD-10-CM

## 2023-02-08 DIAGNOSIS — F51.04 PSYCHOPHYSIOLOGICAL INSOMNIA: ICD-10-CM

## 2023-02-08 DIAGNOSIS — E78.00 PURE HYPERCHOLESTEROLEMIA: ICD-10-CM

## 2023-02-08 RX ORDER — LORAZEPAM 1 MG/1
1 TABLET ORAL NIGHTLY PRN
Qty: 30 TABLET | Refills: 0 | Status: SHIPPED | OUTPATIENT
Start: 2023-02-08

## 2023-02-08 RX ORDER — ROSUVASTATIN CALCIUM 10 MG/1
10 TABLET, COATED ORAL NIGHTLY
Qty: 30 TABLET | Refills: 0 | Status: SHIPPED | OUTPATIENT
Start: 2023-02-08

## 2023-02-08 NOTE — TELEPHONE ENCOUNTER
Future Appointments   Date Time Provider Tammy Wells   3/9/2023 10:30 AM Astrid Pastor, DO [facility] [address] [facility] [address]     Patient requesting refills for Lorazepam & rosuvastatin, please advise. Also wants to know if she needs labs for this appt, please advise.

## 2023-02-08 NOTE — TELEPHONE ENCOUNTER
From: Roderick Leonardo  To: Beth Barrios MD  Sent: 2/8/2023 12:46 PM CST  Subject: Dr Messi Grande     Did Dr Messi Grande leave? No one let me know . . i need my prescription s refilled and no one is getting back to pharmacy please let them me know because i have one left of lorazepam and rouvisyartin ,, thank you

## 2023-03-11 DIAGNOSIS — F51.04 PSYCHOPHYSIOLOGICAL INSOMNIA: ICD-10-CM

## 2023-03-11 DIAGNOSIS — F41.9 ANXIETY: ICD-10-CM

## 2023-03-11 DIAGNOSIS — E78.00 PURE HYPERCHOLESTEROLEMIA: ICD-10-CM

## 2023-03-13 DIAGNOSIS — F51.04 PSYCHOPHYSIOLOGICAL INSOMNIA: ICD-10-CM

## 2023-03-13 DIAGNOSIS — F41.9 ANXIETY: ICD-10-CM

## 2023-03-13 DIAGNOSIS — E78.00 PURE HYPERCHOLESTEROLEMIA: ICD-10-CM

## 2023-03-13 RX ORDER — LORAZEPAM 1 MG/1
1 TABLET ORAL NIGHTLY PRN
Qty: 30 TABLET | Refills: 0 | Status: CANCELLED | OUTPATIENT
Start: 2023-03-13

## 2023-03-13 RX ORDER — ROSUVASTATIN CALCIUM 10 MG/1
10 TABLET, COATED ORAL NIGHTLY
Qty: 30 TABLET | Refills: 0 | Status: CANCELLED | OUTPATIENT
Start: 2023-03-13

## 2023-03-14 DIAGNOSIS — E78.00 PURE HYPERCHOLESTEROLEMIA: ICD-10-CM

## 2023-03-14 DIAGNOSIS — F51.04 PSYCHOPHYSIOLOGICAL INSOMNIA: ICD-10-CM

## 2023-03-14 DIAGNOSIS — F41.9 ANXIETY: ICD-10-CM

## 2023-03-15 RX ORDER — LORAZEPAM 1 MG/1
TABLET ORAL
Qty: 30 TABLET | Refills: 0 | OUTPATIENT
Start: 2023-03-15

## 2023-03-15 RX ORDER — ROSUVASTATIN CALCIUM 10 MG/1
10 TABLET, COATED ORAL NIGHTLY
Qty: 30 TABLET | Refills: 0 | OUTPATIENT
Start: 2023-03-15

## 2023-03-15 RX ORDER — LORAZEPAM 1 MG/1
1 TABLET ORAL NIGHTLY PRN
Qty: 30 TABLET | Refills: 0 | Status: SHIPPED | OUTPATIENT
Start: 2023-03-15 | End: 2023-03-17

## 2023-03-15 RX ORDER — ROSUVASTATIN CALCIUM 10 MG/1
10 TABLET, COATED ORAL NIGHTLY
Qty: 30 TABLET | Refills: 0 | Status: SHIPPED | OUTPATIENT
Start: 2023-03-15 | End: 2023-03-17

## 2023-03-17 ENCOUNTER — OFFICE VISIT (OUTPATIENT)
Dept: FAMILY MEDICINE CLINIC | Facility: CLINIC | Age: 63
End: 2023-03-17
Payer: COMMERCIAL

## 2023-03-17 VITALS
HEART RATE: 76 BPM | SYSTOLIC BLOOD PRESSURE: 112 MMHG | TEMPERATURE: 98 F | RESPIRATION RATE: 14 BRPM | DIASTOLIC BLOOD PRESSURE: 82 MMHG | OXYGEN SATURATION: 97 % | BODY MASS INDEX: 37 KG/M2 | HEIGHT: 66 IN

## 2023-03-17 DIAGNOSIS — Z12.11 SCREENING FOR COLON CANCER: ICD-10-CM

## 2023-03-17 DIAGNOSIS — Z23 NEED FOR VACCINATION: ICD-10-CM

## 2023-03-17 DIAGNOSIS — Z12.31 SCREENING MAMMOGRAM FOR BREAST CANCER: Primary | ICD-10-CM

## 2023-03-17 DIAGNOSIS — Z23 NEED FOR SHINGLES VACCINE: ICD-10-CM

## 2023-03-17 DIAGNOSIS — E78.00 PURE HYPERCHOLESTEROLEMIA: ICD-10-CM

## 2023-03-17 DIAGNOSIS — F41.9 ANXIETY: ICD-10-CM

## 2023-03-17 DIAGNOSIS — F51.04 PSYCHOPHYSIOLOGICAL INSOMNIA: ICD-10-CM

## 2023-03-17 RX ORDER — LORAZEPAM 1 MG/1
1 TABLET ORAL NIGHTLY PRN
Qty: 30 TABLET | Refills: 3 | Status: SHIPPED | OUTPATIENT
Start: 2023-03-17

## 2023-03-17 RX ORDER — ROSUVASTATIN CALCIUM 10 MG/1
10 TABLET, COATED ORAL NIGHTLY
Qty: 30 TABLET | Refills: 0 | Status: SHIPPED | OUTPATIENT
Start: 2023-03-17

## 2023-03-17 RX ORDER — SERTRALINE HYDROCHLORIDE 100 MG/1
100 TABLET, FILM COATED ORAL DAILY
Qty: 90 TABLET | Refills: 1 | Status: SHIPPED | OUTPATIENT
Start: 2023-03-17 | End: 2023-06-15

## 2023-04-06 LAB
ABSOLUTE BASOPHILS: 22 CELLS/UL (ref 0–200)
ABSOLUTE EOSINOPHILS: 73 CELLS/UL (ref 15–500)
ABSOLUTE LYMPHOCYTES: 974 CELLS/UL (ref 850–3900)
ABSOLUTE MONOCYTES: 582 CELLS/UL (ref 200–950)
ABSOLUTE NEUTROPHILS: 3948 CELLS/UL (ref 1500–7800)
ALBUMIN/GLOBULIN RATIO: 1.7 (CALC) (ref 1–2.5)
ALBUMIN: 4.1 G/DL (ref 3.6–5.1)
ALKALINE PHOSPHATASE: 79 U/L (ref 37–153)
ALT: 15 U/L (ref 6–29)
AST: 16 U/L (ref 10–35)
BASOPHILS: 0.4 %
BILIRUBIN, TOTAL: 0.6 MG/DL (ref 0.2–1.2)
BUN: 22 MG/DL (ref 7–25)
CALCIUM: 9.5 MG/DL (ref 8.6–10.4)
CARBON DIOXIDE: 29 MMOL/L (ref 20–32)
CHLORIDE: 104 MMOL/L (ref 98–110)
CHOL/HDLC RATIO: 3.3 (CALC)
CHOLESTEROL, TOTAL: 206 MG/DL
CREATININE: 0.77 MG/DL (ref 0.5–1.05)
EGFR: 86 ML/MIN/1.73M2
EOSINOPHILS: 1.3 %
GLOBULIN: 2.4 G/DL (CALC) (ref 1.9–3.7)
GLUCOSE: 116 MG/DL (ref 65–99)
HDL CHOLESTEROL: 63 MG/DL
HEMATOCRIT: 41.5 % (ref 35–45)
HEMOGLOBIN: 14 G/DL (ref 11.7–15.5)
LDL-CHOLESTEROL: 121 MG/DL (CALC)
LYMPHOCYTES: 17.4 %
MCH: 31.4 PG (ref 27–33)
MCHC: 33.7 G/DL (ref 32–36)
MCV: 93 FL (ref 80–100)
MONOCYTES: 10.4 %
MPV: 12.7 FL (ref 7.5–12.5)
NEUTROPHILS: 70.5 %
NON-HDL CHOLESTEROL: 143 MG/DL (CALC)
PLATELET COUNT: 192 THOUSAND/UL (ref 140–400)
POTASSIUM: 4.6 MMOL/L (ref 3.5–5.3)
PROTEIN, TOTAL: 6.5 G/DL (ref 6.1–8.1)
RDW: 13.1 % (ref 11–15)
RED BLOOD CELL COUNT: 4.46 MILLION/UL (ref 3.8–5.1)
SODIUM: 140 MMOL/L (ref 135–146)
TRIGLYCERIDES: 112 MG/DL
TSH W/REFLEX TO FT4: 2.17 MIU/L (ref 0.4–4.5)
VITAMIN D, 25-OH, TOTAL: 32 NG/ML (ref 30–100)
WHITE BLOOD CELL COUNT: 5.6 THOUSAND/UL (ref 3.8–10.8)

## 2023-05-12 ENCOUNTER — TELEPHONE (OUTPATIENT)
Dept: FAMILY MEDICINE CLINIC | Facility: CLINIC | Age: 63
End: 2023-05-12

## 2023-05-12 DIAGNOSIS — Z23 NEED FOR SHINGLES VACCINE: Primary | ICD-10-CM

## 2023-05-12 NOTE — TELEPHONE ENCOUNTER
Pt is scheduled for nurse visit on 5/17/23 for Shingrix #2    Shingrix #1: 3/17/23    Future Appointments   Date Time Provider Tammy Wells   5/17/2023  9:00 AM [facility] [address] [facility] [address]     Order pended

## 2023-05-13 DIAGNOSIS — E78.00 PURE HYPERCHOLESTEROLEMIA: ICD-10-CM

## 2023-05-13 RX ORDER — ROSUVASTATIN CALCIUM 10 MG/1
10 TABLET, COATED ORAL NIGHTLY
Qty: 30 TABLET | Refills: 0 | Status: SHIPPED | OUTPATIENT
Start: 2023-05-13

## 2023-05-17 ENCOUNTER — NURSE ONLY (OUTPATIENT)
Dept: FAMILY MEDICINE CLINIC | Facility: CLINIC | Age: 63
End: 2023-05-17
Payer: COMMERCIAL

## 2023-05-17 DIAGNOSIS — Z23 NEED FOR SHINGLES VACCINE: Primary | ICD-10-CM

## 2023-05-17 PROCEDURE — 90471 IMMUNIZATION ADMIN: CPT | Performed by: FAMILY MEDICINE

## 2023-05-17 PROCEDURE — 90750 HZV VACC RECOMBINANT IM: CPT | Performed by: FAMILY MEDICINE

## 2023-06-10 DIAGNOSIS — E78.00 PURE HYPERCHOLESTEROLEMIA: ICD-10-CM

## 2023-06-12 RX ORDER — ROSUVASTATIN CALCIUM 10 MG/1
10 TABLET, COATED ORAL NIGHTLY
Qty: 90 TABLET | Refills: 0 | Status: SHIPPED | OUTPATIENT
Start: 2023-06-12

## 2023-06-12 RX ORDER — SERTRALINE HYDROCHLORIDE 100 MG/1
100 TABLET, FILM COATED ORAL DAILY
Qty: 90 TABLET | Refills: 1 | Status: SHIPPED | OUTPATIENT
Start: 2023-06-12 | End: 2023-09-10

## 2023-06-12 NOTE — TELEPHONE ENCOUNTER
Requested Renewals     Name from pharmacy: Rosuvastatin Calcium 10 Mg Tab Nort         Will file in chart as: ROSUVASTATIN 10 MG Oral Tab    Sig: Take 1 tablet (10 mg total) by mouth nightly. Disp: 30 tablet    Refills: 0    Start: 6/10/2023    Class: Normal    Non-formulary For: Pure hypercholesterolemia    Last ordered: 1 month ago by Brandon Mcfadden,  Last refill: 5/13/2023    Rx #: [id number]    Cholesterol Medication Protocol Nufprb97/10/2023 08:31 AM   Protocol Details ALT < 80    ALT resulted within past year    Lipid panel within past 12 months    Appointment within past 12 or next 3 months           No future appointments. LOV: 3/17/23  Last physical exam done 7/28/22    Labs done 4/5/23    RX sent.

## 2023-06-12 NOTE — TELEPHONE ENCOUNTER
Requested Renewals     sertraline 100 MG Oral Tab         Sig: Take 1 tablet (100 mg total) by mouth daily. Disp: 90 tablet    Refills: 1    Start: 6/10/2023 - 9/8/2023    Class: Normal          No future appointments. LOV: 3/17/23  Last physical exam done 7/28/22    -medication pended for review.

## 2023-08-09 DIAGNOSIS — F41.9 ANXIETY: ICD-10-CM

## 2023-08-09 DIAGNOSIS — F51.04 PSYCHOPHYSIOLOGICAL INSOMNIA: ICD-10-CM

## 2023-08-12 ENCOUNTER — TELEPHONE (OUTPATIENT)
Dept: FAMILY MEDICINE CLINIC | Facility: CLINIC | Age: 63
End: 2023-08-12

## 2023-08-12 NOTE — TELEPHONE ENCOUNTER
Page received from [facility] with message of patient requesting refill on the medication, the medication was not specified. Outgoing call made to patient, patient did not answer, message left for patient to call back if needed.

## 2023-08-14 DIAGNOSIS — F41.9 ANXIETY: ICD-10-CM

## 2023-08-14 DIAGNOSIS — F51.04 PSYCHOPHYSIOLOGICAL INSOMNIA: ICD-10-CM

## 2023-08-14 NOTE — TELEPHONE ENCOUNTER
Requested Renewals     Name from pharmacy: Lorazepam 1 Mg Tab Teva         Will file in chart as: LORAZEPAM 1 MG Oral Tab    Sig: Take 1 tablet (1 mg total) by mouth nightly as needed. Disp: 30 tablet    Refills: 0    Start: 8/9/2023    Class: Normal    Non-formulary For: Anxiety, Psychophysiological insomnia    Last ordered: 5 months ago by Bea Chang DO Last refill: 7/12/2023    Rx #: [id number]       To be filled at: [address] [place] [address], [phone]          No future appointments. LOV: 3/17/23  Last physical exam done 7/28/22    [facility]:  [facility] Medication Data  Medication Dispense History (from 2/15/2023 to 8/12/2023)  Expand All  Collapse All  LORazepam     Dispensed Written Strength Quantity Refills Days Supply Provider Pharmacy   LORAZEPAM 07/11/2023 03/17/2023 1 mg 30  30 M[facility]AM[facility], F[facility]   LORAZEPAM 06/12/2023 03/17/2023 1 mg 30  30 MOHAMMED, [facility]EZA [facility]   LORAZEPAM 05/13/2023 03/17/2023 1 mg 30  30 MOHAMMED, FAYEZA [facility]   LORAZEPAM 04/13/2023 03/17/2023 1 mg 30  30 MOHAMMED, FAYEZA [facility]   LORAZEPAM 03/15/2023 03/15/2023 1 mg 30  30 MOHAMMED, FAYEZA           -medication pended for review.

## 2023-08-15 RX ORDER — LORAZEPAM 1 MG/1
1 TABLET ORAL NIGHTLY PRN
Qty: 30 TABLET | Refills: 3 | Status: SHIPPED | OUTPATIENT
Start: 2023-08-15

## 2023-08-15 RX ORDER — LORAZEPAM 1 MG/1
1 TABLET ORAL NIGHTLY PRN
Qty: 30 TABLET | Refills: 0 | Status: SHIPPED | OUTPATIENT
Start: 2023-08-15

## 2023-08-15 NOTE — TELEPHONE ENCOUNTER
Requesting Lorazepam 1mg  LOV: 3/17/23  RTC: not noted  Last Relevant Labs: 4/5/23  Filled: 3/17/23 #30 with 3 refills    No future appointments.     Per [place] , last dispensed 7/11/23 #30    Rx pended and routed for approval/denial

## 2023-09-12 DIAGNOSIS — E78.00 PURE HYPERCHOLESTEROLEMIA: ICD-10-CM

## 2023-09-12 RX ORDER — ROSUVASTATIN CALCIUM 10 MG/1
10 TABLET, COATED ORAL NIGHTLY
Qty: 90 TABLET | Refills: 0 | Status: SHIPPED | OUTPATIENT
Start: 2023-09-12

## 2023-12-14 DIAGNOSIS — E78.00 PURE HYPERCHOLESTEROLEMIA: ICD-10-CM

## 2023-12-14 RX ORDER — ROSUVASTATIN CALCIUM 10 MG/1
10 TABLET, COATED ORAL NIGHTLY
Qty: 90 TABLET | Refills: 0 | Status: SHIPPED | OUTPATIENT
Start: 2023-12-14

## 2024-03-04 DIAGNOSIS — F51.01 PRIMARY INSOMNIA: ICD-10-CM

## 2024-03-05 NOTE — TELEPHONE ENCOUNTER
Requesting Lorazepam 1mg  Last OV: 3/17/23  RTC: not noted  Last Rx'd 1/25/24 #30 with 0 refills    No future appointments.    Per [place] , last dispensed 1/25/24 #30     It has been almost a year since her last visit, can we have her schedule an appointment.

## 2024-03-07 NOTE — TELEPHONE ENCOUNTER
Controlled Substance Medication [id number]/07/2024 12:57 PM    This medication is a controlled substance - forward to provider to refill

## 2024-03-07 NOTE — TELEPHONE ENCOUNTER
Future Appointments   Date Time Provider Department Center   3/21/2024 12:30 PM Suzy Pastor,  [facility] [address] [facility] [address]

## 2024-03-09 RX ORDER — LORAZEPAM 1 MG/1
1 TABLET ORAL NIGHTLY PRN
Qty: 30 TABLET | Refills: 0 | Status: SHIPPED | OUTPATIENT
Start: 2024-03-09

## 2024-03-14 RX ORDER — SERTRALINE HYDROCHLORIDE 100 MG/1
100 TABLET, FILM COATED ORAL DAILY
Qty: 90 TABLET | Refills: 0 | Status: SHIPPED | OUTPATIENT
Start: 2024-03-14 | End: 2024-06-12

## 2024-03-14 NOTE — TELEPHONE ENCOUNTER
Requested Prescriptions     Pending Prescriptions Disp Refills    SERTRALINE 100 MG Oral Tab [Pharmacy Med Name: Sertraline Hydrochloride 100 Mg Tab Nort] 90 tablet 0     Sig: Take 1 tablet (100 mg total) by mouth daily.           LOV:  6/12/23  RTC:   Filled: 6/12/23 #90 with 1 refills    Future Appointments   Date Time Provider Department Center   3/21/2024 12:30 PM Suzy Pastor DO [facility] [address] [facility] [address]

## 2024-03-21 ENCOUNTER — OFFICE VISIT (OUTPATIENT)
Dept: FAMILY MEDICINE CLINIC | Facility: CLINIC | Age: 64
End: 2024-03-21
Payer: COMMERCIAL

## 2024-03-21 VITALS
RESPIRATION RATE: 16 BRPM | SYSTOLIC BLOOD PRESSURE: 132 MMHG | TEMPERATURE: 97 F | DIASTOLIC BLOOD PRESSURE: 88 MMHG | HEART RATE: 82 BPM | WEIGHT: 205 LBS | OXYGEN SATURATION: 97 % | HEIGHT: 64.53 IN | BODY MASS INDEX: 34.57 KG/M2

## 2024-03-21 DIAGNOSIS — Z12.31 BREAST CANCER SCREENING BY MAMMOGRAM: ICD-10-CM

## 2024-03-21 DIAGNOSIS — E78.00 PURE HYPERCHOLESTEROLEMIA: Primary | ICD-10-CM

## 2024-03-21 DIAGNOSIS — Z13.820 SCREENING FOR OSTEOPOROSIS: ICD-10-CM

## 2024-03-21 DIAGNOSIS — F51.01 PRIMARY INSOMNIA: ICD-10-CM

## 2024-03-21 DIAGNOSIS — F41.9 ANXIETY: ICD-10-CM

## 2024-03-21 DIAGNOSIS — Z12.31 SCREENING MAMMOGRAM, ENCOUNTER FOR: ICD-10-CM

## 2024-03-21 PROCEDURE — 99214 OFFICE O/P EST MOD 30 MIN: CPT | Performed by: FAMILY MEDICINE

## 2024-03-21 PROCEDURE — 3079F DIAST BP 80-89 MM HG: CPT | Performed by: FAMILY MEDICINE

## 2024-03-21 PROCEDURE — 3008F BODY MASS INDEX DOCD: CPT | Performed by: FAMILY MEDICINE

## 2024-03-21 PROCEDURE — 3075F SYST BP GE 130 - 139MM HG: CPT | Performed by: FAMILY MEDICINE

## 2024-03-21 RX ORDER — LORAZEPAM 1 MG/1
1 TABLET ORAL NIGHTLY PRN
Qty: 30 TABLET | Refills: 5 | Status: SHIPPED | OUTPATIENT
Start: 2024-03-21

## 2024-03-21 RX ORDER — SERTRALINE HYDROCHLORIDE 100 MG/1
100 TABLET, FILM COATED ORAL DAILY
Qty: 90 TABLET | Refills: 0 | Status: SHIPPED | OUTPATIENT
Start: 2024-03-21 | End: 2024-06-19

## 2024-03-21 RX ORDER — ROSUVASTATIN CALCIUM 10 MG/1
10 TABLET, COATED ORAL NIGHTLY
Qty: 90 TABLET | Refills: 1 | Status: SHIPPED | OUTPATIENT
Start: 2024-03-21

## 2024-03-21 NOTE — PROGRESS NOTES
HPI:   Subjective   Chief Complaint   Patient presents with    Medication Request     refills         Hyperlipidemia  This is a chronic problem. The problem is controlled. Exacerbating diseases include obesity. She has no history of chronic renal disease, diabetes or liver disease. Pertinent negatives include no focal weakness, leg pain, myalgias or shortness of breath. Current antihyperlipidemic treatment includes statins. The current treatment provides moderate improvement of lipids. There are no compliance problems.  Risk factors for coronary artery disease include dyslipidemia and hypertension.     Patient has hx fo anxiety. She is on sertraline. Has been more stressed recently. Her mother  6 weeks.   Reports depression. She stresses more lately. She has chronic hx of insomnia and she is on lorazepam nightly 0.5mg.       She denies abnormal feelings of sadness, crying spells, mood fluctuations,  psychomotor retardation, recurrent thoughts of death, suicidal thoughts without plan and suicidal thoughts with specific plan.       She is due for blood work.      Her aunt  of colon cancer.         Wt Readings from Last 6 Encounters:   24 205 lb (93 kg)   22 229 lb (103.9 kg)   22 224 lb (101.6 kg)   22 179 lb 14.3 oz (81.6 kg)   22 180 lb (81.6 kg)   21 226 lb (102.5 kg)           Hypertension Labs   Lab Results   Component Value Date    CREATSERUM 0.77 2023    K 4.6 2023    CHOLEST 206 (H) 2023    CHOLEST 189.00 2021    CHOLEST 230.00 (H) 2020    HDL 63 2023    HDL 60 2021    HDL 58 2020     (H) 2023     2021     (H) 2020    TRIG 112 2023    TRIG 122.00 2021    TRIG 128.00 2020        Wt Readings from Last 3 Encounters:   24 205 lb (93 kg)   22 229 lb (103.9 kg)   22 224 lb (101.6 kg)     BP Readings from Last 3 Encounters:   24 132/88    03/17/23 112/82   12/21/22 141/84          Past History:   The following portions of the patient's history were reviewed in this encounter and updated as appropriate:  Chief Complaint Reviewed and Verified  Nursing Notes Reviewed and   Verified  Tobacco Reviewed  Allergies Reviewed  Medications Reviewed    Medical History Reviewed  Surgical History Reviewed  Family History   Reviewed  Social History Reviewed          She  has a past medical history of Anxiety state, unspecified, Cancer of cervix (HCC), and Esophageal reflux.   Her family history includes Cancer in her father; Hypertension in her mother.   She  reports that she has never smoked. She has been exposed to tobacco smoke. She has never used smokeless tobacco. She reports current alcohol use. She reports that she does not use drugs.     She is allergic to erythromycin, benadryl [altaryl], clindamycin, and demerol.     Current Outpatient Medications on File Prior to Visit   Medication Sig    SERTRALINE 100 MG Oral Tab Take 1 tablet (100 mg total) by mouth daily.    LORazepam 1 MG Oral Tab Take 1 tablet (1 mg total) by mouth nightly as needed. AT BEDTIME    ROSUVASTATIN 10 MG Oral Tab Take 1 tablet (10 mg total) by mouth nightly.    LORazepam 1 MG Oral Tab Take 1 tablet (1 mg total) by mouth nightly as needed.    LORazepam 1 MG Oral Tab Take 1 tablet (1 mg total) by mouth nightly as needed.    VITAMIN E OR Vaginal suppository    Esomeprazole Magnesium 40 MG Oral Capsule Delayed Release Take 1 capsule (40 mg total) by mouth every morning before breakfast. (Patient not taking: Reported on 3/21/2024)    Dicyclomine HCl 20 MG Oral Tab Take 1 tablet (20 mg total) by mouth 4 (four) times daily as needed.     No current facility-administered medications on file prior to visit.     CBC  (most recent labs)   Lab Results   Component Value Date/Time    WBC 5.6 04/05/2023 08:21 AM    HGB 14.0 04/05/2023 08:21 AM    HCT 41.5 04/05/2023 08:21 AM      04/05/2023 08:21 AM         CMP  (most recent labs)   Lab Results   Component Value Date/Time     (H) 04/05/2023 08:21 AM    BUN 22 04/05/2023 08:21 AM    CREATSERUM 0.77 04/05/2023 08:21 AM    GFRNAA 94 07/05/2022 07:57 PM    GFRAA 109 07/05/2022 07:57 PM    EGFRCR 86 04/05/2023 08:21 AM    CA 9.5 04/05/2023 08:21 AM    ALKPHO 79 04/05/2023 08:21 AM    AST 16 04/05/2023 08:21 AM    ALT 15 04/05/2023 08:21 AM    BILT 0.6 04/05/2023 08:21 AM    TP 6.5 04/05/2023 08:21 AM    ALB 4.1 04/05/2023 08:21 AM     04/05/2023 08:21 AM    K 4.6 04/05/2023 08:21 AM     04/05/2023 08:21 AM    CO2 29 04/05/2023 08:21 AM           Lipids  (most recent labs)   Lab Results   Component Value Date/Time    CHOLEST 206 (H) 04/05/2023 08:21 AM    TRIG 112 04/05/2023 08:21 AM    HDL 63 04/05/2023 08:21 AM     (H) 04/05/2023 08:21 AM    NONHDLC 143 (H) 04/05/2023 08:21 AM           REVIEW OF SYSTEMS:   GENERAL HEALTH: feels well otherwise  Constitutional: Negative for fever, chills and fatigue. No distress.  HENT: Negative for hearing loss, congestion   Eyes: Negative for pain and visual disturbance.   Respiratory: Negative for cough, chest tightness, shortness of breath and wheezing.    Cardiovascular: Negative for chest pain, palpitations and leg swelling.   Gastrointestinal: Negative for nausea, vomiting, abdominal pain, diarrhea, blood in stool        EXAM:   /88   Pulse 82   Temp 97.2 °F (36.2 °C) (Temporal)   Resp 16   Ht 5' 4.53\" (1.639 m)   Wt 205 lb (93 kg)   SpO2 97%   BMI 34.61 kg/m²  Estimated body mass index is 34.61 kg/m² as calculated from the following:    Height as of this encounter: 5' 4.53\" (1.639 m).    Weight as of this encounter: 205 lb (93 kg).   GENERAL: well developed, well nourished,in no apparent distress  LUNGS: clear to auscultation  CARDIO: RRR without murmur  GI: good BS's,no masses, HSM or tenderness  EXTREMITIES: no cyanosis, clubbing or edema  NEURO: A&O to person  place and time.   No anhedonia, nor loss of hope.    ASSESSMENT/PLAN:        1. Pure hypercholesterolemia  Continue on statin.   Labs ordered.   - CBC With Differential With Platelet  - Comp Metabolic Panel (14)  - Lipid Panel  - rosuvastatin 10 MG Oral Tab; Take 1 tablet (10 mg total) by mouth nightly.  Dispense: 90 tablet; Refill: 1    2. Anxiety    no suicidal or homicidal thoughts. Continue present management. Patient will call if any symptoms worsen. Patient understands risks that anti-depressants as well as anti-anxiety agents have been shown to paradoxically worsen anxiety and depression and trigger suicidal thoughts. If any of these thoughts are present patient will stop the medication(s) immediately and call the office. Patient understands and agrees to the above plan.    - sertraline 100 MG Oral Tab; Take 1 tablet (100 mg total) by mouth daily.  Dispense: 90 tablet; Refill: 0    3. Breast cancer screening by mammogram  - [facility] ALEXI 2D+3D SCREENING BILAT (CPT=77067/15280); Future    4. Screening mammogram, encounter for  - OP REFERRAL TO GASTROENTEROLOGY    5. Primary insomnia  - LORazepam 1 MG Oral Tab; Take 1 tablet (1 mg total) by mouth nightly as needed. AT BEDTIME  Dispense: 30 tablet; Refill: 5    6. Screening for osteoporosis  - XR DEXA BONE DENSITOMETRY (CPT=77080); Future       F/u for physical.      Patient Education: Reviewed risks of hypertension and principles of treatment.  Counseling time: not applicable.    Continue with current treatment plan.    No follow-ups on file.   Suzy Pastor DO

## 2024-09-11 ENCOUNTER — HOSPITAL ENCOUNTER (OUTPATIENT)
Dept: GENERAL RADIOLOGY | Age: 64
Discharge: HOME | End: 2024-09-11
Attending: FAMILY MEDICINE
Payer: COMMERCIAL

## 2024-09-11 ENCOUNTER — PATIENT MESSAGE (OUTPATIENT)
Dept: FAMILY MEDICINE CLINIC | Facility: CLINIC | Age: 64
End: 2024-09-11

## 2024-09-11 ENCOUNTER — OFFICE VISIT (OUTPATIENT)
Dept: FAMILY MEDICINE CLINIC | Facility: CLINIC | Age: 64
End: 2024-09-11
Payer: COMMERCIAL

## 2024-09-11 VITALS
HEIGHT: 64 IN | RESPIRATION RATE: 18 BRPM | SYSTOLIC BLOOD PRESSURE: 120 MMHG | HEART RATE: 79 BPM | BODY MASS INDEX: 34.18 KG/M2 | WEIGHT: 200.19 LBS | TEMPERATURE: 97 F | OXYGEN SATURATION: 99 % | DIASTOLIC BLOOD PRESSURE: 70 MMHG
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
## 2024-09-11 DIAGNOSIS — M25.512 ACUTE PAIN OF LEFT SHOULDER: Primary | ICD-10-CM

## 2024-09-11 DIAGNOSIS — M25.512 ACUTE PAIN OF LEFT SHOULDER: ICD-10-CM

## 2024-09-11 DIAGNOSIS — M25.532 ACUTE WRIST PAIN, LEFT: ICD-10-CM

## 2024-09-11 PROCEDURE — 73030 X-RAY EXAM OF SHOULDER: CPT | Performed by: FAMILY MEDICINE

## 2024-09-11 PROCEDURE — 3078F DIAST BP <80 MM HG: CPT | Performed by: FAMILY MEDICINE

## 2024-09-11 PROCEDURE — 99214 OFFICE O/P EST MOD 30 MIN: CPT | Performed by: FAMILY MEDICINE

## 2024-09-11 PROCEDURE — 3074F SYST BP LT 130 MM HG: CPT | Performed by: FAMILY MEDICINE

## 2024-09-11 PROCEDURE — 73110 X-RAY EXAM OF WRIST: CPT | Performed by: FAMILY MEDICINE

## 2024-09-11 PROCEDURE — 3008F BODY MASS INDEX DOCD: CPT | Performed by: FAMILY MEDICINE

## 2024-09-11 RX ORDER — SERTRALINE HYDROCHLORIDE 100 MG/1
100 TABLET, FILM COATED ORAL DAILY
COMMUNITY
Start: 2024-06-29

## 2024-09-11 RX ORDER — BACLOFEN 10 MG/1
10 TABLET ORAL NIGHTLY
Qty: 30 TABLET | Refills: 0 | Status: SHIPPED | OUTPATIENT
Start: 2024-09-11 | End: 2024-10-11

## 2024-09-11 NOTE — PROGRESS NOTES
Subjective:   Marilyn Post is a 64 year old female who presents for Fall (Patient states she fell on 8/7/24 in a parking lot and landed on her left shoulder and the left side of her face. Patient states she is still experiencing pain and swelling from the left shoulder to her left wrist. )     Patient states that she fell on her out stretched arm about a month or so ago and it is not getting better, patient is having pain and discomfort in left shoulder and still has minor swelling in her wrist. Patient denies any follow up medical care immediately after fall. Patient states that she thought it would get better.  No obvious outward signs of injury at this time. Patient states that she is having pain and limited ROM.   History/Other:    Chief Complaint Reviewed and Verified  Nursing Notes Reviewed and   Verified  Tobacco Reviewed  Allergies Reviewed  Medications Reviewed    Problem List Reviewed  Medical History Reviewed  Surgical History   Reviewed  OB Status Reviewed  Family History Reviewed  Social History   Reviewed         Tobacco:  She has never smoked tobacco.    Current Outpatient Medications   Medication Sig Dispense Refill    Esomeprazole Magnesium (NEXIUM 24HR OR) daily.      sertraline 100 MG Oral Tab Take 1 tablet (100 mg total) by mouth daily.      baclofen 10 MG Oral Tab Take 1 tablet (10 mg total) by mouth at bedtime. 30 tablet 0    rosuvastatin 10 MG Oral Tab Take 1 tablet (10 mg total) by mouth nightly. 90 tablet 1    LORazepam 1 MG Oral Tab Take 1 tablet (1 mg total) by mouth nightly as needed. AT BEDTIME 30 tablet 5    Dicyclomine HCl 20 MG Oral Tab Take 1 tablet (20 mg total) by mouth 4 (four) times daily as needed. 120 tablet 5         Review of Systems:  Review of Systems   Constitutional: Negative.    Respiratory: Negative.     Cardiovascular: Negative.    Gastrointestinal: Negative.    Musculoskeletal:  Positive for joint swelling and myalgias.   Skin: Negative.     Neurological: Negative.        Objective:   /70 (BP Location: Right arm, Patient Position: Sitting, Cuff Size: adult)   Pulse 79   Temp 97.1 °F (36.2 °C) (Temporal)   Resp 18   Ht 5' 4\" (1.626 m)   Wt 200 lb 3.2 oz (90.8 kg)   SpO2 99%   BMI 34.36 kg/m²  Estimated body mass index is 34.36 kg/m² as calculated from the following:    Height as of this encounter: 5' 4\" (1.626 m).    Weight as of this encounter: 200 lb 3.2 oz (90.8 kg).  Physical Exam  Constitutional:       Appearance: She is well-developed.   HENT:      Head: Normocephalic and atraumatic.      Nose: Nose normal.      Mouth/Throat:      Mouth: Mucous membranes are moist.      Pharynx: Oropharynx is clear.   Eyes:      Conjunctiva/sclera: Conjunctivae normal.   Cardiovascular:      Rate and Rhythm: Normal rate.   Pulmonary:      Effort: Pulmonary effort is normal.   Skin:     General: Skin is warm and dry.   Neurological:      General: No focal deficit present.      Mental Status: She is alert and oriented to person, place, and time.   Psychiatric:         Mood and Affect: Mood normal.         Behavior: Behavior normal.         Thought Content: Thought content normal.         Judgment: Judgment normal.           Assessment & Plan:   1. Acute pain of left shoulder (Primary)  -     Baclofen; Take 1 tablet (10 mg total) by mouth at bedtime.  Dispense: 30 tablet; Refill: 0  -     XR SHOULDER, COMPLETE (MIN 2 VIEWS), LEFT (CPT=73030); Future; Expected date: 09/11/2024  -     Physical Therapy Referral - [facility]  2. Acute wrist pain, left  -     XR WRIST COMPLETE (MIN 3 VIEWS), LEFT (CPT=73110); Future; Expected date: 09/11/2024  -     Physical Therapy Referral - [facility]  3. Fall, initial encounter  -     Baclofen; Take 1 tablet (10 mg total) by mouth at bedtime.  Dispense: 30 tablet; Refill: 0  Get xrays today  Follow up with PT.   If not better with PT and medication will consider ortho referral.     Follow up as needed.    Dionicio  Chevy, BURAK, 9/11/2024, 12:29 PM

## 2024-09-12 NOTE — TELEPHONE ENCOUNTER
From: Marilyn Post  To: Dionicio Reece  Sent: 9/11/2024 2:38 PM CDT  Subject: Shoulder    Where ahould i go to physical therapy? And is muscle relaxent called in? Thank you munir

## 2024-09-17 ENCOUNTER — TELEPHONE (OUTPATIENT)
Dept: PHYSICAL THERAPY | Facility: HOSPITAL | Age: 64
End: 2024-09-17

## 2024-09-17 ENCOUNTER — OFFICE VISIT (OUTPATIENT)
Dept: PHYSICAL THERAPY | Age: 64
End: 2024-09-17
Attending: FAMILY MEDICINE
Payer: COMMERCIAL

## 2024-09-17 DIAGNOSIS — M25.532 ACUTE WRIST PAIN, LEFT: ICD-10-CM

## 2024-09-17 DIAGNOSIS — M25.512 ACUTE PAIN OF LEFT SHOULDER: Primary | ICD-10-CM

## 2024-09-17 PROCEDURE — 97162 PT EVAL MOD COMPLEX 30 MIN: CPT

## 2024-09-17 PROCEDURE — 97110 THERAPEUTIC EXERCISES: CPT

## 2024-09-17 NOTE — PROGRESS NOTES
SHOULDER EVALUATION:     Diagnosis:   Acute pain of left shoulder (M25.512)  Acute wrist pain, left (M25.532)      Referring Provider: Dionicio Reece  Date of Evaluation:    9/17/2024    Precautions:  None Next MD visit:   none scheduled  Date of Surgery: n/a     PATIENT SUMMARY   Marilyn Post is a 64 year old female who presents to therapy today with complaints of L sided shoulder, wrist and neck discomfort following a fall. Patient states she fell on 8/7/24 in a parking lot and landed on her left shoulder and the left side of her face. Patient states she is still experiencing pain and swelling from the left shoulder to her left wrist.   Pt describes pain level current 7/10, at best 3/10, at worst 7/10.   Current functional limitations include inability to sleep on her L side due to pain, difficulty w/ lifting, reaching behind the back and overhead.     Marilyn describes prior level of function independent w/o difficulty. Pt goals include pain control and improve function of her LUE to return to [facility].  Past medical history was reviewed with Marilyn. Significant findings include   Past Medical History:   Diagnosis Date    Anxiety state, unspecified     Cancer of cervix (HCC)     Esophageal reflux         ASSESSMENT  Marilyn presents to physical therapy evaluation with primary c/o L sided shoulder, wrist and neck discomfort following a fall. The results of the objective tests and measures show a decrease in L sided shoulder AROM in all motions, weakness throughout her rotator cuff and scapular retractor musculature, and AC joint tenderness with cross body adduction. Functional deficits include but are not limited to limited ability with lifting, overhead and behind the back movements and certain work activities.  Signs and symptoms are consistent with diagnosis of L sided rotator cuff involvement/aggravation following fall. Pt and PT discussed evaluation findings, pathology, POC and HEP.  Pt voiced  understanding and performs HEP correctly without reported pain. Skilled Physical Therapy is medically necessary to address the above impairments and reach functional goals.     OBJECTIVE:   Observation/Posture: Forward head rounded shoulders posture  Palpation: Tenderness with palpation at her AC joint, upper trap, supraspinatus, levator, rhomboids and subscapularis on the R.  Sensation: Intact and equal bilaterally   Cervical Screen: Limited side bending and rotation bilaterally due to upper back discomfort(upper trap,levator). Pt denies paresthesias or N&T.     AROM: (* denotes performed with pain)  Shoulder  Elbow C-Spine   Flexion: R 175; L 150  Abduction: R 175; L 140  ER: R 80; L 65  IR: R 75; L 30 Flexion: R WNl; L WNL  Extension: R WNL; L WNL   Rotation; R: 75, L: 50  Side bending; R: 15, L: 10     Accessory motion: Hypomobility throughout her T-spine in the motions of extension and rotation bilaterally.    Flexibility: Moderate restrictions of her pec musculature bilaterally resulting in anterior migration of her humeral head bilaterally.      Strength/MMT: (* denotes performed with pain)  Shoulder Elbow Scapular   Flexion: R 5-/5; *L 4/5  Abduction: R 5-/5; *L 4/5  ER: R 5-/5; *L 4-/5  IR: R 5/5; *L 4+/5 Flexion: R 5/5; L 5/5  Extension: R 5/5; L 5/5   Rhomboids: R 4-/5, *L 4-/5  Mid trap: R 3+/5; *L 3+/5   Low trap: R 3+/5; *L 3+/5     Special tests:   Labrum Special Testing:   Compression Rotation Test: R -, L +    Instability Special Testing:   Apprehension Test: R -, L +    Subacromial Pain Syndrome:  Lindsay-Josias Impingement Sign: R -, L +  Neer Impingement Test: R -, L +      Today’s Treatment and Response:   Pt education was provided on exam findings, treatment diagnosis, treatment plan, expectations, and prognosis. Pt was also provided recommendations for activity modifications, possible soreness after evaluation, modalities as needed [ice/heat], postural corrections, ergonomics, detrimental  fear avoidance behaviors, and importance of remaining active.  Patient was instructed in and issued a HEP for: Access Code: [email or web address]  URL: [email or web address]  Date: 09/17/2024  Prepared by: Daniel Frank    Exercises  - Supine Shoulder Flexion Extension AAROM with Dowel  - 1 x daily - 5-6 x weekly - 3 sets - 10 reps  - Correct Seated Posture  - 1 x daily - 7 x weekly  - Seated Scapular Retraction  - 1 x daily - 5 x weekly - 3 sets - 10 reps  - Supine Shoulder Horizontal Abduction with Resistance  - 1 x daily - 4 x weekly - 3 sets - 10-12 reps  - Shoulder extension with resistance - Neutral  - 1 x daily - 3-4 x weekly - 3 sets - 10-12 reps    Charges: PT Eval Moderate Complexity, 22      Total Timed Treatment: 23 min     Total Treatment Time: 45 min       PLAN OF CARE:    Goals: (to be met in 8 visits)   Pt will report improved ability to sleep without waking due to shoulder pain   Pt will improve shoulder flexion AROM to >175 degrees to be able to reach into overhead cabinets without pain or restriction   Pt will improve shoulder abduction AROM to >175 degrees to improve ability to don deodorant, don/doff shirts, and wash hair   Pt will increase shoulder AROM ER to 85 to be able to reach and fasten seatbelt   Pt will increase shoulder AROM IR to 75 to be able to reach in back pocket, tuck in shirt, and turn steering wheel without pain  Pt will improve shoulder strength throughout to 4+/5 to improve function with overhead, lifting and work activities.   Pt will demonstrate increased mid/low trap strength to 4+/5 to promote improved shoulder mechanics and stabilization with lifting and reaching   Pt will be independent and compliant with comprehensive HEP to maintain progress achieved in PT     Frequency / Duration: Patient will be seen for 2 x/week or a total of 8 visits over a 90 day period. Treatment will include: Manual Therapy, Neuromuscular Re-education, Self-Care Home Management,  Therapeutic Activities, Therapeutic Exercise, Home Exercise Program instruction, and Modalities to include: Modalities as needed for pain modulation.     Education or treatment limitation: None  Rehab Potential:good    Neck Disability Index Score  Score: 44 % (9/17/2024  2:03 PM)      Patient/Family/Caregiver was advised of these findings, precautions, and treatment options and has agreed to actively participate in planning and for this course of care.    Thank you for your referral. Please co-sign or sign and return this letter via fax as soon as possible to [phone]. If you have any questions, please contact me at Dept: [phone]    Sincerely,  Electronically signed by therapist: Daniel Frank PT  Physician's certification required: Yes  I certify the need for these services furnished under this plan of treatment and while under my care.    X___________________________________________________ Date____________________    Certification From: 9/17/2024  To:12/16/2024

## 2024-09-24 DIAGNOSIS — E78.00 PURE HYPERCHOLESTEROLEMIA: ICD-10-CM

## 2024-09-25 ENCOUNTER — OFFICE VISIT (OUTPATIENT)
Dept: PHYSICAL THERAPY | Age: 64
End: 2024-09-25
Attending: FAMILY MEDICINE
Payer: COMMERCIAL

## 2024-09-25 PROCEDURE — 97140 MANUAL THERAPY 1/> REGIONS: CPT

## 2024-09-25 PROCEDURE — 97110 THERAPEUTIC EXERCISES: CPT

## 2024-09-25 NOTE — PROGRESS NOTES
Diagnosis:   Acute pain of left shoulder (M25.512)  Acute wrist pain, left (M25.532)       Referring Provider: Dionicio Reece  Date of Evaluation:     9/17/2024     Precautions:  None Next MD visit:   none scheduled  Date of Surgery: n/a   Insurance Primary/Secondary: BCBS OUT OF STATE / N/A     # Auth Visits: 5            Subjective: Pt arrives to the clinic with reports of a small improvement when it comes to her L sided shoulder pain following her initial evaluation. Pt states that she barely noticed any symptoms while in [place] over the weekend. Pt does reports occasional pain with cross body adduction as well as tightness around her posterior shoulder that radiates to her neck ipsilaterally.    Pain: 4/10      Objective: Weakness throughout her scapular retractors as well as T-spine hypomobility in the motions extension and rotation bilaterally.    Assessment: Pt responded well to skilled therapy session w/o an increase in shoulder discomfort following exercise prescription and progression. Pt was progressed in exercises that focused on T-spine mobility and rotator cuff strengthening. Pt was given an updated HEP for continued maintenance and progression. Pt will continue to benefit from skilled therapy to improve scapulohumeral rhythm and function of her LUE.       Goals:  (to be met in 8 visits)   Pt will report improved ability to sleep without waking due to shoulder pain   Pt will improve shoulder flexion AROM to >175 degrees to be able to reach into overhead cabinets without pain or restriction   Pt will improve shoulder abduction AROM to >175 degrees to improve ability to don deodorant, don/doff shirts, and wash hair   Pt will increase shoulder AROM ER to 85 to be able to reach and fasten seatbelt   Pt will increase shoulder AROM IR to 75 to be able to reach in back pocket, tuck in shirt, and turn steering wheel without pain  Pt will improve shoulder strength throughout to 4+/5 to improve function with  overhead, lifting and work activities.   Pt will demonstrate increased mid/low trap strength to 4+/5 to promote improved shoulder mechanics and stabilization with lifting and reaching   Pt will be independent and compliant with comprehensive HEP to maintain progress achieved in PT   Plan: Progress scapular retractor and rotator cuff strengthening  Date: 9/25/2024  TX#: 2/5 Date:                 TX#: 3/ Date:                 TX#: 4/ Date:                 TX#: 5/ Date:   Tx#: 6/   Upper trap and levator strumming       Scapular retraction using yellow TB 3x10       Standing rows using blue TB 3x12       DNF chin tuck 3x12       Horizontal abduction in supine using green TB 3x12        Open book stretch w/ C-spine rotation 2x10 per side       HEP:   - Supine Shoulder Flexion Extension AAROM with Dowel  - 1 x daily - 5-6 x weekly - 3 sets - 10 reps  - Correct Seated Posture  - 1 x daily - 7 x weekly  - Seated Scapular Retraction  - 1 x daily - 5 x weekly - 3 sets - 10 reps  - Supine Shoulder Horizontal Abduction with Resistance  - 1 x daily - 4 x weekly - 3 sets - 10-12 reps  - Shoulder extension with resistance - Neutral  - 1 x daily - 3-4 x weekly - 3 sets - 10-12 reps  -Sidelying Open Book Thoracic Rotation with Knee on Foam Roll  - 1 x daily - 3-5 x weekly - 2-3 sets - 10 reps    Charges: TherX(2) MT(1)       Total Timed Treatment: 45 min  Total Treatment Time: 45 min

## 2024-09-27 ENCOUNTER — OFFICE VISIT (OUTPATIENT)
Dept: PHYSICAL THERAPY | Age: 64
End: 2024-09-27
Attending: FAMILY MEDICINE
Payer: COMMERCIAL

## 2024-09-27 PROCEDURE — 97140 MANUAL THERAPY 1/> REGIONS: CPT

## 2024-09-27 PROCEDURE — 97110 THERAPEUTIC EXERCISES: CPT

## 2024-09-27 NOTE — PROGRESS NOTES
Diagnosis:   Acute pain of left shoulder (M25.512)  Acute wrist pain, left (M25.532)       Referring Provider: Dionicio Reece  Date of Evaluation:     9/17/2024     Precautions:  None Next MD visit:   none scheduled  Date of Surgery: n/a   Insurance Primary/Secondary: BCBS OUT OF STATE / N/A     # Auth Visits: 5            Subjective: Pt arrives to the clinic with reports of tightness around her L upper neck and shoulder blade. Pt does report occasional pain with cross body adduction and when being seated at work(poor posture). Denies numbness/tingling.     Pain: 3/10      Objective: Weakness throughout her scapular retractors as well as T-spine hypomobility in the motions extension and rotation bilaterally.    Assessment: Pt responded well to skilled therapy session w/o an increase in shoulder discomfort following exercise prescription and progression. Pt was progressed in exercises that focused on T-spine mobility and rotator cuff strengthening. Pt will continue to benefit from skilled therapy to improve scapulohumeral rhythm and function of her LUE.       Goals:  (to be met in 8 visits)   Pt will report improved ability to sleep without waking due to shoulder pain   Pt will improve shoulder flexion AROM to >175 degrees to be able to reach into overhead cabinets without pain or restriction   Pt will improve shoulder abduction AROM to >175 degrees to improve ability to don deodorant, don/doff shirts, and wash hair   Pt will increase shoulder AROM ER to 85 to be able to reach and fasten seatbelt   Pt will increase shoulder AROM IR to 75 to be able to reach in back pocket, tuck in shirt, and turn steering wheel without pain  Pt will improve shoulder strength throughout to 4+/5 to improve function with overhead, lifting and work activities.   Pt will demonstrate increased mid/low trap strength to 4+/5 to promote improved shoulder mechanics and stabilization with lifting and reaching   Pt will be independent and  compliant with comprehensive HEP to maintain progress achieved in PT   Plan: Progress scapular retractor and rotator cuff strengthening  Date: 9/25/2024  TX#: 2/5 Date:9/27/2024                TX#: 3/5 Date:                 TX#: 4/ Date:                 TX#: 5/ Date:   Tx#: 6/   Upper trap and levator strumming Upper trap, levator and rhomboid strumming      Scapular retraction using yellow TB 3x10 Scapular retraction using red TB 3x10      Standing rows using blue TB 3x12 Horizontal abduction in supine using green TB 3x12       DNF chin tuck 3x12 Standing rows using blue TB 3x12      Horizontal abduction in supine using green TB 3x12  Open book stretch w/ C-spine rotation 2x10 per side      Open book stretch w/ C-spine rotation 2x10 per side OH flexion on towel roll to bias T-spine extension 3x10       HEP:   - Supine Shoulder Flexion Extension AAROM with Dowel  - 1 x daily - 5-6 x weekly - 3 sets - 10 reps  - Correct Seated Posture  - 1 x daily - 7 x weekly  - Seated Scapular Retraction  - 1 x daily - 5 x weekly - 3 sets - 10 reps  - Supine Shoulder Horizontal Abduction with Resistance  - 1 x daily - 4 x weekly - 3 sets - 10-12 reps  - Shoulder extension with resistance - Neutral  - 1 x daily - 3-4 x weekly - 3 sets - 10-12 reps  -Sidelying Open Book Thoracic Rotation with Knee on Foam Roll  - 1 x daily - 3-5 x weekly - 2-3 sets - 10 reps    Charges: TherX(2) MT(1)  hot pack(1)     Total Timed Treatment: 45 min  Total Treatment Time: 45 min

## 2024-10-01 ENCOUNTER — TELEPHONE (OUTPATIENT)
Dept: FAMILY MEDICINE CLINIC | Facility: CLINIC | Age: 64
End: 2024-10-01

## 2024-10-01 ENCOUNTER — TELEPHONE (OUTPATIENT)
Dept: PHYSICAL THERAPY | Facility: HOSPITAL | Age: 64
End: 2024-10-01

## 2024-10-01 ENCOUNTER — OFFICE VISIT (OUTPATIENT)
Dept: PHYSICAL THERAPY | Age: 64
End: 2024-10-01
Attending: FAMILY MEDICINE
Payer: COMMERCIAL

## 2024-10-01 ENCOUNTER — APPOINTMENT (OUTPATIENT)
Dept: PHYSICAL THERAPY | Age: 64
End: 2024-10-01
Attending: FAMILY MEDICINE
Payer: COMMERCIAL

## 2024-10-01 PROCEDURE — 97110 THERAPEUTIC EXERCISES: CPT

## 2024-10-01 PROCEDURE — 97140 MANUAL THERAPY 1/> REGIONS: CPT

## 2024-10-01 RX ORDER — SERTRALINE HYDROCHLORIDE 100 MG/1
100 TABLET, FILM COATED ORAL DAILY
Qty: 90 TABLET | Refills: 0 | OUTPATIENT
Start: 2024-10-01

## 2024-10-01 RX ORDER — SERTRALINE HYDROCHLORIDE 100 MG/1
100 TABLET, FILM COATED ORAL DAILY
Qty: 90 TABLET | Refills: 0 | Status: SHIPPED | OUTPATIENT
Start: 2024-10-01

## 2024-10-01 NOTE — TELEPHONE ENCOUNTER
Requesting     Disp Refills Start End     sertraline 100 MG Oral Tab -- -- 6/29/2024 --    Sig - Route: Take 1 tablet (100 mg total) by mouth daily. - Oral    Class: Historical      LOV: 9/11/2024 w/Dionicio for fall  RTC: prn    Filled:     Dispensed Written Strength Quantity Refills Days Supply Provider Pharmacy    SERTRALINE 100 MG TAB NORT 06/29/2024 03/21/2024 100 90 each  90 Suzy Pastor DO [id number] - [place]...       Future Appointments   Date Time Provider Department Center   10/1/2024  3:30 PM Daniel Frank PT [facility]    10/11/2024  8:30 AM [facility]     Psychiatric Non-Scheduled (Anti-Anxiety) [id number]/29/2024 10:06 AM   Protocol Details In person appointment or virtual visit in the past 6 mos or appointment in next 3 mos    Depression Screening completed within the past 12 months     Rx sent per protocol

## 2024-10-01 NOTE — TELEPHONE ENCOUNTER
Patient is calling to follow up on medication request.  Patient is going out of town tomorrow and in need of refill.     Disp Refills Start End    sertraline 100 MG Oral Tab -- -- 6/29/2024 --    Sig - Route: Take 1 tablet (100 mg total) by mouth daily. - Oral    Class: Historical      [id number] - [place], [place] - [address] [phone], [phone]

## 2024-10-01 NOTE — PROGRESS NOTES
Diagnosis:   Acute pain of left shoulder (M25.512)  Acute wrist pain, left (M25.532)       Referring Provider: Dionicio Reece  Date of Evaluation:     9/17/2024     Precautions:  None Next MD visit:   none scheduled  Date of Surgery: n/a   Insurance Primary/Secondary: BCBS OUT OF STATE / N/A     # Auth Visits: 5            Subjective: Pt arrives to the clinic with reports of tightness around her L upper neck and shoulder blade after work. Denies numbness/tingling at this time.     Pain: 3/10 following a long work day in the area of her upper trap and posterior shoulder on the L.       Objective: Weakness throughout her scapular retractors as well as T-spine hypomobility in the motions extension and rotation bilaterally. Multiple trigger points and soft tissue restrictions throughout her upper and middle traps bilaterally.    Assessment: Pt responded well to skilled therapy session w/o an increase in shoulder discomfort following exercise prescription and progression. Pt was progressed in exercises that focused on T-spine mobility and rotator cuff strengthening. Pt will continue to benefit from skilled therapy to improve scapulohumeral rhythm and function of her LUE.       Goals:  (to be met in 8 visits)   Pt will report improved ability to sleep without waking due to shoulder pain. MET  Pt will improve shoulder flexion AROM to >175 degrees to be able to reach into overhead cabinets without pain or restriction   Pt will improve shoulder abduction AROM to >175 degrees to improve ability to don deodorant, don/doff shirts, and wash hair   Pt will increase shoulder AROM ER to 85 to be able to reach and fasten seatbelt   Pt will increase shoulder AROM IR to 75 to be able to reach in back pocket, tuck in shirt, and turn steering wheel without pain  Pt will improve shoulder strength throughout to 4+/5 to improve function with overhead, lifting and work activities.   Pt will demonstrate increased mid/low trap strength to 4+/5  to promote improved shoulder mechanics and stabilization with lifting and reaching   Pt will be independent and compliant with comprehensive HEP to maintain progress achieved in PT   Plan: Progress scapular retractor and rotator cuff strengthening  Date: 9/25/2024  TX#: 2/5 Date:9/27/2024                TX#: 3/5 Date:10/1/2024                 TX#: 4/5 Date:                 TX#: 5/ Date:   Tx#: 6/   Upper trap and levator strumming Upper trap, levator and rhomboid strumming Upper trap, levator and rhomboid strumming     Scapular retraction using yellow TB 3x10 Scapular retraction using red TB 3x10 Scapular retraction using red TB 3x10     Standing rows using blue TB 3x12 Horizontal abduction in supine using green TB 3x12  Horizontal abduction in supine using blue TB 3x12      DNF chin tuck 3x12 Standing rows using blue TB 3x12 DNF chin tuck 3x12     Horizontal abduction in supine using green TB 3x12  Open book stretch w/ C-spine rotation 2x10 per side Open book stretch w/ C-spine rotation 2x10 per side     Open book stretch w/ C-spine rotation 2x10 per side OH flexion on towel roll to bias T-spine extension 3x10  Wall clocks/climbs using yellow TB at wrist 3x6      HEP:   - Supine Shoulder Flexion Extension AAROM with Dowel  - 1 x daily - 5-6 x weekly - 3 sets - 10 reps  - Correct Seated Posture  - 1 x daily - 7 x weekly  - Seated Scapular Retraction  - 1 x daily - 5 x weekly - 3 sets - 10 reps  - Supine Shoulder Horizontal Abduction with Resistance  - 1 x daily - 4 x weekly - 3 sets - 10-12 reps  - Shoulder extension with resistance - Neutral  - 1 x daily - 3-4 x weekly - 3 sets - 10-12 reps  -Sidelying Open Book Thoracic Rotation with Knee on Foam Roll  - 1 x daily - 3-5 x weekly - 2-3 sets - 10 reps    Charges: TherX(2) MT(1)  hot pack(1)     Total Timed Treatment: 45 min  Total Treatment Time: 45 min

## 2024-10-02 RX ORDER — ROSUVASTATIN CALCIUM 10 MG/1
10 TABLET, COATED ORAL NIGHTLY
Qty: 30 TABLET | Refills: 0 | Status: SHIPPED | OUTPATIENT
Start: 2024-10-02

## 2024-10-11 ENCOUNTER — OFFICE VISIT (OUTPATIENT)
Dept: PHYSICAL THERAPY | Age: 64
End: 2024-10-11
Attending: FAMILY MEDICINE
Payer: COMMERCIAL

## 2024-10-11 PROCEDURE — 97140 MANUAL THERAPY 1/> REGIONS: CPT

## 2024-10-11 PROCEDURE — 97110 THERAPEUTIC EXERCISES: CPT

## 2024-10-11 NOTE — PROGRESS NOTES
Diagnosis:   Acute pain of left shoulder (M25.512)  Acute wrist pain, left (M25.532)       Referring Provider: Dionicio Reece  Date of Evaluation:     9/17/2024     Precautions:  None Next MD visit:   none scheduled  Date of Surgery: n/a   Insurance Primary/Secondary: BCBS OUT OF STATE / N/A     # Auth Visits: 5            Subjective: Pt arrives to the clinic with reports of tightness around her L upper neck and shoulder when waking up this morning. Pt still has occasional \"clicking\" in her neck with certain head movements as well as some shoulder discomfort with cross body adduction on the L.  Denies numbness/tingling at this time.     Pain: 3/10 more of an stiffness than a pain in the area of her L upper trap and shoulder      Objective: Weakness throughout her scapular retractors as well as T-spine hypomobility in the motions extension and rotation bilaterally. Multiple trigger points and soft tissue restrictions throughout her upper and middle traps bilaterally.    Assessment: Pt responded well to skilled therapy session w/o an increase in shoulder discomfort following exercise prescription and progression. Pt was progressed in exercises that focused on T-spine mobility and scapular retractor strengthening due to deficits in the aforementioned areas. Pt will continue to benefit from skilled therapy to improve scapulohumeral rhythm and function of her LUE.       Goals:  (to be met in 8 visits)   Pt will report improved ability to sleep without waking due to shoulder pain. MET  Pt will improve shoulder flexion AROM to >175 degrees to be able to reach into overhead cabinets without pain or restriction   Pt will improve shoulder abduction AROM to >175 degrees to improve ability to don deodorant, don/doff shirts, and wash hair   Pt will increase shoulder AROM ER to 85 to be able to reach and fasten seatbelt   Pt will increase shoulder AROM IR to 75 to be able to reach in back pocket, tuck in shirt, and turn steering  wheel without pain  Pt will improve shoulder strength throughout to 4+/5 to improve function with overhead, lifting and work activities.   Pt will demonstrate increased mid/low trap strength to 4+/5 to promote improved shoulder mechanics and stabilization with lifting and reaching   Pt will be independent and compliant with comprehensive HEP to maintain progress achieved in PT   Plan: Progress scapular retractor and rotator cuff strengthening  Date: 9/25/2024  TX#: 2/5 Date:9/27/2024                TX#: 3/5 Date:10/1/2024                 TX#: 4/5 Date:10/11/2024                 TX#: 5/5 Date:   Tx#: 6/   Upper trap and levator strumming Upper trap, levator and rhomboid strumming Upper trap, levator and rhomboid strumming Upper trap, levator and rhomboid strumming    Scapular retraction using yellow TB 3x10 Scapular retraction using red TB 3x10 Scapular retraction using red TB 3x10 Standing rows using blue TB 3x12    Standing rows using blue TB 3x12 Horizontal abduction in supine using green TB 3x12  Horizontal abduction in supine using blue TB 3x12  Horizontal abduction in supine using blue TB 3x12     DNF chin tuck 3x12 Standing rows using blue TB 3x12 DNF chin tuck 3x12 Serratus punch in supine 3x10 using 4 lbs    Horizontal abduction in supine using green TB 3x12  Open book stretch w/ C-spine rotation 2x10 per side Open book stretch w/ C-spine rotation 2x10 per side DNF chin tuck 3x12    Open book stretch w/ C-spine rotation 2x10 per side OH flexion on towel roll to bias T-spine extension 3x10  Wall clocks/climbs using yellow TB at wrist 3x6  Scapular retraction using red TB 3x12 in standing with foam roller against the wall    HEP:   - Supine Shoulder Flexion Extension AAROM with Dowel  - 1 x daily - 5-6 x weekly - 3 sets - 10 reps  - Correct Seated Posture  - 1 x daily - 7 x weekly  - Seated Scapular Retraction  - 1 x daily - 5 x weekly - 3 sets - 10 reps  - Supine Shoulder Horizontal Abduction with Resistance   - 1 x daily - 4 x weekly - 3 sets - 10-12 reps  - Shoulder extension with resistance - Neutral  - 1 x daily - 3-4 x weekly - 3 sets - 10-12 reps  -Sidelying Open Book Thoracic Rotation with Knee on Foam Roll  - 1 x daily - 3-5 x weekly - 2-3 sets - 10 reps    Charges: TherX(2) MT(1)  hot pack(1)     Total Timed Treatment: 45 min  Total Treatment Time: 45 min

## 2024-10-15 DIAGNOSIS — F51.01 PRIMARY INSOMNIA: ICD-10-CM

## 2024-10-15 NOTE — TELEPHONE ENCOUNTER
Requesting Lorazepam 1mg  Last OV: 9/11/24  RTC: prn  Last Rx'd 3/21/24 #30 with 5 refills    Future Appointments   Date Time Provider Department Center   10/30/2024  1:00 PM Daniel Frank, PT [facility]   11/1/2024 12:30 PM Daniel Frank, PT [facility]   11/4/2024 10:00 AM Daniel Frank, [facility]   11/13/2024 10:00 AM Daniel Frank PT [facility]       Per [place] , last dispensed 9/12/24 #30    Controlled med:  Rx pended and routed for approval/denial

## 2024-10-17 RX ORDER — LORAZEPAM 1 MG/1
1 TABLET ORAL NIGHTLY PRN
Qty: 90 TABLET | Refills: 1 | Status: SHIPPED | OUTPATIENT
Start: 2024-10-17

## 2024-10-17 NOTE — TELEPHONE ENCOUNTER
Sent MyChart for patient to call office to schedule an appointment for Complete Physical Exam for further refills.

## 2024-10-30 ENCOUNTER — OFFICE VISIT (OUTPATIENT)
Dept: PHYSICAL THERAPY | Age: 64
End: 2024-10-30
Attending: FAMILY MEDICINE
Payer: COMMERCIAL

## 2024-10-30 PROCEDURE — 97140 MANUAL THERAPY 1/> REGIONS: CPT

## 2024-10-30 PROCEDURE — 97110 THERAPEUTIC EXERCISES: CPT

## 2024-10-30 NOTE — PROGRESS NOTES
Diagnosis:   Acute pain of left shoulder (M25.512)  Acute wrist pain, left (M25.532)       Referring Provider: Dionicio Reece  Date of Evaluation:     9/17/2024     Precautions:  None Next MD visit:   none scheduled  Date of Surgery: n/a   Insurance Primary/Secondary: [employer] OUT OF STATE / N/A     # Auth Visits: 5            Subjective: Pt arrives to the clinic with reports of tightness around her L upper neck and shoulder when waking up this morning. Pt states that her neck and shoulder discomfort seemed to have gotten slighlty worse when it comes to her pain levels. Due to schedule and conflict PT attendance has not been consistent which could limit overall progress. Pt continues to endorse an occasional \"clicking\" in her neck with certain head movements as well as some shoulder discomfort with cross body adduction on the L.  Denies numbness/tingling at this time.     Pain: 3/10 more of an stiffness than a pain in the area of her L upper trap and shoulder.      Objective: Weakness throughout her scapular retractors as well as T-spine hypomobility in the motions extension and rotation bilaterally. Multiple trigger points and soft tissue restrictions throughout her upper and middle traps bilaterally. C-spine ROM was measured and displayed improvements: L rotation improved to 65 degrees, R rotation improved to 85 degrees. Shoulder ROM on the L improved to 165 degrees shoulder flexion and 150 degrees abduction.     AROM: (* denotes performed with pain)  Shoulder  Elbow C-Spine   Flexion: R 175; L 150  Abduction: R 175; L 140  ER: R 80; L 65  IR: R 75; L 30 Flexion: R WNl; L WNL  Extension: R WNL; L WNL    Rotation; R: 75, L: 50  Side bending; R: 15, L: 10       Assessment: Pt responded well to skilled therapy session with reports of a decrease in shoulder and neck discomfort following exercise and manual prescription. Pt displayed an improvement in AROM of both her shoulder as well as her C-spine but pain levels and  clicking has remained the same. Pt was reminded importance of posture and ergonomics in order to maximize overall progress. Pt was progressed in exercises that focused on T-spine mobility and scapular retractor strengthening due to deficits in the aforementioned areas. Pt will continue to benefit from skilled therapy to improve scapulohumeral rhythm and function of her LUE.       Goals:  (to be met in 8 visits)   Pt will report improved ability to sleep without waking due to shoulder pain. MET  Pt will improve shoulder flexion AROM to >175 degrees to be able to reach into overhead cabinets without pain or restriction   Pt will improve shoulder abduction AROM to >175 degrees to improve ability to don deodorant, don/doff shirts, and wash hair   Pt will increase shoulder AROM ER to 85 to be able to reach and fasten seatbelt   Pt will increase shoulder AROM IR to 75 to be able to reach in back pocket, tuck in shirt, and turn steering wheel without pain  Pt will improve shoulder strength throughout to 4+/5 to improve function with overhead, lifting and work activities.   Pt will demonstrate increased mid/low trap strength to 4+/5 to promote improved shoulder mechanics and stabilization with lifting and reaching   Pt will be independent and compliant with comprehensive HEP to maintain progress achieved in PT   Plan: Progress scapular retractor and rotator cuff strengthening  Date: 9/25/2024  TX#: 2/5 Date:9/27/2024                TX#: 3/5 Date:10/1/2024                 TX#: 4/5 Date:10/11/2024                 TX#: 5/5 Date:10/30/2024   Tx#: 6/10   Upper trap and levator strumming Upper trap, levator and rhomboid strumming Upper trap, levator and rhomboid strumming Upper trap, levator and rhomboid strumming Upper trap, levator and rhomboid strumming   Scapular retraction using yellow TB 3x10 Scapular retraction using red TB 3x10 Scapular retraction using red TB 3x10 Standing rows using blue TB 3x12 Standing rows using blue  TB 3x12   Standing rows using blue TB 3x12 Horizontal abduction in supine using green TB 3x12  Horizontal abduction in supine using blue TB 3x12  Horizontal abduction in supine using blue TB 3x12  Horizontal abduction in supine using blue TB 3x12    DNF chin tuck 3x12 Standing rows using blue TB 3x12 DNF chin tuck 3x12 Serratus punch in supine 3x10 using 4 lbs Hook-lying on foam roller swimmers to improve CT motion 3x10   Horizontal abduction in supine using green TB 3x12  Open book stretch w/ C-spine rotation 2x10 per side Open book stretch w/ C-spine rotation 2x10 per side DNF chin tuck 3x12 Hook-lying on foam roller prolonged pec stretch w/ diaphragmatic breathing 3x45 sec.   Open book stretch w/ C-spine rotation 2x10 per side OH flexion on towel roll to bias T-spine extension 3x10  Wall clocks/climbs using yellow TB at wrist 3x6  Scapular retraction using red TB 3x12 in standing with foam roller against the wall Scapular retraction using red TB 3x12 in standing with foam roller against the wall   HEP:   - Supine Shoulder Flexion Extension AAROM with Dowel  - 1 x daily - 5-6 x weekly - 3 sets - 10 reps  - Correct Seated Posture  - 1 x daily - 7 x weekly  - Seated Scapular Retraction  - 1 x daily - 5 x weekly - 3 sets - 10 reps  - Supine Shoulder Horizontal Abduction with Resistance  - 1 x daily - 4 x weekly - 3 sets - 10-12 reps  - Shoulder extension with resistance - Neutral  - 1 x daily - 3-4 x weekly - 3 sets - 10-12 reps  -Sidelying Open Book Thoracic Rotation with Knee on Foam Roll  - 1 x daily - 3-5 x weekly - 2-3 sets - 10 reps    Charges: TherX(2) MT(1)  hot pack(1)     Total Timed Treatment: 45 min  Total Treatment Time: 45 min

## 2024-11-01 ENCOUNTER — OFFICE VISIT (OUTPATIENT)
Dept: PHYSICAL THERAPY | Age: 64
End: 2024-11-01
Attending: FAMILY MEDICINE
Payer: COMMERCIAL

## 2024-11-01 PROCEDURE — 97140 MANUAL THERAPY 1/> REGIONS: CPT

## 2024-11-01 PROCEDURE — 97110 THERAPEUTIC EXERCISES: CPT

## 2024-11-01 NOTE — PROGRESS NOTES
Diagnosis:   Acute pain of left shoulder (M25.512)  Acute wrist pain, left (M25.532)       Referring Provider: Dionicio Reece  Date of Evaluation:     9/17/2024     Precautions:  None Next MD visit:   none scheduled  Date of Surgery: n/a   Insurance Primary/Secondary: [employer] OUT OF STATE / N/A     # Auth Visits: 5            Subjective: Pt arrives to the clinic with reports of tightness around her L upper neck and shoulder when waking up this morning. Pt continues to endorse an occasional \"clicking\" in her neck with certain head movements as well as some shoulder discomfort with cross body adduction on the L.  Denies numbness/tingling at this time. Pt plans on getting a deep tissue massage next week to help decrease her upper neck and back stiffness.     Pain: 3/10 more of an stiffness than a pain in the area of her L upper trap and shoulder. Post treatment 1/10      Objective: Weakness throughout her scapular retractors as well as T-spine hypomobility in the motions extension and rotation bilaterally. Multiple trigger points and soft tissue restrictions throughout her upper and middle traps bilaterally. C-spine ROM was measured and displayed improvements: L rotation improved to 65 degrees, R rotation improved to 85 degrees. Shoulder ROM on the L improved to 165 degrees shoulder flexion and 150 degrees abduction.     AROM: (* denotes performed with pain)  Shoulder  Elbow C-Spine   Flexion: R 175; L 150  Abduction: R 175; L 140  ER: R 80; L 70  IR: R 75; L 60 Flexion: R WNl; L WNL  Extension: R WNL; L WNL    Rotation; R: 75, L: 50  Side bending; R: 15, L: 10       Assessment: Pt responded well to skilled therapy session with reports of a decrease in shoulder and neck discomfort following exercise and manual prescription. Pt displayed an improvement in AROM of both her shoulder as well as her C-spine but pain levels and clicking has remained the same. Pt was reminded importance of posture and ergonomics in order to  maximize overall progress. Pt was progressed in exercises that focused on T-spine mobility and scapular retractor strengthening due to deficits in the aforementioned areas. Pt will continue to benefit from skilled therapy to improve scapulohumeral rhythm and function of her LUE.       Goals:  (to be met in 8 visits)   Pt will report improved ability to sleep without waking due to shoulder pain. MET  Pt will improve shoulder flexion AROM to >175 degrees to be able to reach into overhead cabinets without pain or restriction   Pt will improve shoulder abduction AROM to >175 degrees to improve ability to don deodorant, don/doff shirts, and wash hair   Pt will increase shoulder AROM ER to 85 to be able to reach and fasten seatbelt.   Pt will increase shoulder AROM IR to 75 to be able to reach in back pocket, tuck in shirt, and turn steering wheel without pain  Pt will improve shoulder strength throughout to 4+/5 to improve function with overhead, lifting and work activities.   Pt will demonstrate increased mid/low trap strength to 4+/5 to promote improved shoulder mechanics and stabilization with lifting and reaching.  Pt will be independent and compliant with comprehensive HEP to maintain progress achieved in PT.    Plan: Progress scapular retractor and rotator cuff strengthening to help improve overall posture.  Date:10/1/2024                 TX#: 4/5 Date:10/11/2024                 TX#: 5/5 Date:10/30/2024   Tx#: 6/10 Date:11/1/2024   Tx#: 7/10   Upper trap, levator and rhomboid strumming Upper trap, levator and rhomboid strumming Upper trap, levator and rhomboid strumming UBE x6 min forward/backward   Scapular retraction using red TB 3x10 Standing rows using blue TB 3x12 Standing rows using blue TB 3x12 Standing rows using blue TB 3x12   Horizontal abduction in supine using blue TB 3x12  Horizontal abduction in supine using blue TB 3x12  Horizontal abduction in supine using blue TB 3x12  Horizontal abduction in  supine using blue TB 3x12    DNF chin tuck 3x12 Serratus punch in supine 3x10 using 4 lbs Hook-lying on foam roller swimmers to improve CT motion 3x10 Hook-lying on foam roller swimmers to improve CT motion 3x10   Open book stretch w/ C-spine rotation 2x10 per side DNF chin tuck 3x12 Hook-lying on foam roller prolonged pec stretch w/ diaphragmatic breathing 3x45 sec. Hook-lying on foam roller prolonged pec stretch w/ diaphragmatic breathing 3x45 sec.   Wall clocks/climbs using yellow TB at wrist 3x6  Scapular retraction using red TB 3x12 in standing with foam roller against the wall Scapular retraction using red TB 3x12 in standing with foam roller against the wall Scapular retraction using red TB 3x12 in standing with foam roller against the wall      DNF chin tuck 3x12   HEP:   - Supine Shoulder Flexion Extension AAROM with Dowel  - 1 x daily - 5-6 x weekly - 3 sets - 10 reps  - Correct Seated Posture  - 1 x daily - 7 x weekly  - Seated Scapular Retraction  - 1 x daily - 5 x weekly - 3 sets - 10 reps  - Supine Shoulder Horizontal Abduction with Resistance  - 1 x daily - 4 x weekly - 3 sets - 10-12 reps  - Shoulder extension with resistance - Neutral  - 1 x daily - 3-4 x weekly - 3 sets - 10-12 reps  -Sidelying Open Book Thoracic Rotation with Knee on Foam Roll  - 1 x daily - 3-5 x weekly - 2-3 sets - 10 reps    Charges: TherX(2) MT(1)     Total Timed Treatment: 45 min  Total Treatment Time: 45 min

## 2024-11-04 ENCOUNTER — APPOINTMENT (OUTPATIENT)
Dept: PHYSICAL THERAPY | Age: 64
End: 2024-11-04
Attending: FAMILY MEDICINE
Payer: COMMERCIAL

## 2024-11-04 ENCOUNTER — OFFICE VISIT (OUTPATIENT)
Dept: PHYSICAL THERAPY | Age: 64
End: 2024-11-04
Attending: FAMILY MEDICINE
Payer: COMMERCIAL

## 2024-11-04 PROCEDURE — 97110 THERAPEUTIC EXERCISES: CPT

## 2024-11-04 PROCEDURE — 97140 MANUAL THERAPY 1/> REGIONS: CPT

## 2024-11-05 NOTE — PROGRESS NOTES
Diagnosis:   Acute pain of left shoulder (M25.512)  Acute wrist pain, left (M25.532)       Referring Provider: Dionicio Reece  Date of Evaluation:     9/17/2024     Precautions:  None Next MD visit:   none scheduled  Date of Surgery: n/a   Insurance Primary/Secondary: [employer] OUT OF STATE / N/A     # Auth Visits: 5            Subjective: Pt arrives to the clinic with reports of tightness and clicking around her L upper neck(CT junction) and shoulder. Pt continues to endorse an occasional \"clicking\" in her neck with certain head movements(forward flexion and rotation) as well as some shoulder discomfort with cross body adduction on the L.  Denies numbness/tingling at this time. No other concerns at this time    Pain: 3/10 more of an stiffness than a pain in the area of her L upper trap and shoulder. Post treatment 1/10      Objective: Weakness throughout her scapular retractors as well as T-spine hypomobility in the motions extension and rotation bilaterally. Multiple trigger points and soft tissue restrictions throughout her upper and middle traps bilaterally. C-spine ROM was measured and displayed improvements: L rotation improved to 65 degrees, R rotation improved to 85 degrees. Shoulder ROM on the L improved to 165 degrees shoulder flexion and 150 degrees abduction.     AROM: (* denotes performed with pain)  Shoulder  Elbow C-Spine   Flexion: R 175; L 150  Abduction: R 175; L 140  ER: R 80; L 70  IR: R 75; L 60 Flexion: R WNl; L WNL  Extension: R WNL; L WNL    Rotation; R: 75, L: 50  Side bending; R: 15, L: 10       Assessment: Pt responded well to skilled therapy session with reports of a decrease in shoulder and neck discomfort following exercise and manual prescription. Pt presents with hypomobility at the CT junction as well as at her upper C-spine with rotation. Pt was reminded importance of HEP completion(specifically chin tuck and SNAGs), posture and ergonomics in order to maximize overall progress. Pt was  progressed in exercises that focused on T-spine mobility and scapular retractor strengthening due to deficits in the aforementioned areas. Pt will continue to benefit from skilled therapy to improve scapulohumeral rhythm and function of her LUE.       Goals:  (to be met in 8 visits)   Pt will report improved ability to sleep without waking due to shoulder pain. MET  Pt will improve shoulder flexion AROM to >175 degrees to be able to reach into overhead cabinets without pain or restriction   Pt will improve shoulder abduction AROM to >175 degrees to improve ability to don deodorant, don/doff shirts, and wash hair   Pt will increase shoulder AROM ER to 85 to be able to reach and fasten seatbelt.   Pt will increase shoulder AROM IR to 75 to be able to reach in back pocket, tuck in shirt, and turn steering wheel without pain  Pt will improve shoulder strength throughout to 4+/5 to improve function with overhead, lifting and work activities.   Pt will demonstrate increased mid/low trap strength to 4+/5 to promote improved shoulder mechanics and stabilization with lifting and reaching.  Pt will be independent and compliant with comprehensive HEP to maintain progress achieved in PT.    Plan: Progress scapular retractor and rotator cuff strengthening to help improve overall posture.  Date:10/1/2024                 TX#: 4/5 Date:10/11/2024                 TX#: 5/5 Date:10/30/2024   Tx#: 6/10 Date:11/1/2024   Tx#: 7/10 Date:11/4/2024   Tx#: 8/10   Upper trap, levator and rhomboid strumming Upper trap, levator and rhomboid strumming Upper trap, levator and rhomboid strumming UBE x6 min forward/backward UBE x6 min forward/backward + hot pack   Scapular retraction using red TB 3x10 Standing rows using blue TB 3x12 Standing rows using blue TB 3x12 Standing rows using blue TB 3x12 Standing rows using black TB 3x12   Horizontal abduction in supine using blue TB 3x12  Horizontal abduction in supine using blue TB 3x12  Horizontal  abduction in supine using blue TB 3x12  Horizontal abduction in supine using blue TB 3x12  Horizontal abduction in supine using blue TB 3x12    DNF chin tuck 3x12 Serratus punch in supine 3x10 using 4 lbs Hook-lying on foam roller swimmers to improve CT motion 3x10 Hook-lying on foam roller swimmers to improve CT motion 3x10 Upper trap, levator and rhomboid strumming + CT rotational mobilization   Open book stretch w/ C-spine rotation 2x10 per side DNF chin tuck 3x12 Hook-lying on foam roller prolonged pec stretch w/ diaphragmatic breathing 3x45 sec. Hook-lying on foam roller prolonged pec stretch w/ diaphragmatic breathing 3x45 sec. DNF chin tuck 3x12   Wall clocks/climbs using yellow TB at wrist 3x6  Scapular retraction using red TB 3x12 in standing with foam roller against the wall Scapular retraction using red TB 3x12 in standing with foam roller against the wall Scapular retraction using red TB 3x12 in standing with foam roller against the wall Scapular retraction using red TB 3x12 in standing with foam roller against the wall       Seated SNAGs for rotation +slight overpressure       DNF chin tuck 3x12 Upper trap, levator stretches w/ chin tuck 3x20 seconds each   HEP:   - Supine Shoulder Flexion Extension AAROM with Dowel  - 1 x daily - 5-6 x weekly - 3 sets - 10 reps  - Correct Seated Posture  - 1 x daily - 7 x weekly  - Seated Scapular Retraction  - 1 x daily - 5 x weekly - 3 sets - 10 reps  - Supine Shoulder Horizontal Abduction with Resistance  - 1 x daily - 4 x weekly - 3 sets - 10-12 reps  - Shoulder extension with resistance - Neutral  - 1 x daily - 3-4 x weekly - 3 sets - 10-12 reps  -Sidelying Open Book Thoracic Rotation with Knee on Foam Roll  - 1 x daily - 3-5 x weekly - 2-3 sets - 10 reps    Charges: TherX(2) MT(1)     Total Timed Treatment: 45 min  Total Treatment Time: 45 min

## 2024-11-22 ENCOUNTER — OFFICE VISIT (OUTPATIENT)
Dept: PHYSICAL THERAPY | Age: 64
End: 2024-11-22
Attending: FAMILY MEDICINE
Payer: COMMERCIAL

## 2024-11-22 PROCEDURE — 97140 MANUAL THERAPY 1/> REGIONS: CPT

## 2024-11-22 PROCEDURE — 97110 THERAPEUTIC EXERCISES: CPT

## 2024-11-22 NOTE — PROGRESS NOTES
Diagnosis:   Acute pain of left shoulder (M25.512)  Acute wrist pain, left (M25.532)       Referring Provider: Dionicio Reece  Date of Evaluation:     9/17/2024     Precautions:  None Next MD visit:   none scheduled  Date of Surgery: n/a   Insurance Primary/Secondary: [employer] OUT OF STATE / N/A     # Auth Visits: 10           Discharge Summary  Pt has attended 9 visits in Physical Therapy. Pt has displayed an improvement in her shoulder AROM since initial evaluation improving by over 50 degrees in both flexion and abduction. Pt continues to endorse her neck pain and tightness but did have an improvement in her C-spine rotation from 45 degrees to now 65 degrees. Pt has pain in the area of her levator and upper trap on the L which is limiting her overall motion.     Subjective: Pt arrives to the clinic with reports of tightness and clicking around her L upper neck(CT junction) and shoulder. Pt states that she had an incident last week when she was getting her luggage out of the back of an uber when the uber  accidentally closed the trunk hitting her in the back of the head resulting in an increase in neck pain.  Pt continues to endorse an occasional \"clicking\" in her neck with certain head movements(forward flexion and rotation) as well as some shoulder discomfort with cross body adduction on the L.  Denies numbness/tingling at this time.     Pain: 2-3/10 more of an stiffness than a pain in the area of her L upper trap and shoulder.     Objective: Weakness throughout her scapular retractors as well as T-spine hypomobility in the motions extension and rotation bilaterally. Multiple trigger points and soft tissue restrictions throughout her upper and middle traps bilaterally. C-spine ROM was measured and displayed improvements: L rotation improved to 65 degrees, R rotation improved to 85 degrees. Shoulder ROM on the L improved to 165 degrees shoulder flexion and 150 degrees abduction.     AROM: (* denotes performed  with pain)  Shoulder  Elbow C-Spine   Flexion: R 175; L 175  Abduction: R 175; L 160  ER: R 80; L 75  IR: R 75; L 75 Flexion: R WNl; L WNL  Extension: R WNL; L WNL    Rotation; R: 80, L: 65  Side bending; R: 15, L: 10       Assessment: Pt responded well to skilled therapy session with reports of a decrease in shoulder and neck discomfort following exercise and manual prescription. Pt presents with hypomobility at the CT junction as well as at her upper C-spine with rotation. Pt was reminded importance of HEP completion(specifically chin tuck and SNAGs), posture and ergonomics in order to maximize overall progress. Pt was progressed in exercises that focused on T-spine mobility and scapular retractor strengthening due to deficits in the aforementioned areas. Pt plans on scheduling a follow-up appointment with her PCP to track progress and see next steps since her neck symptoms have seemed to plateau with therapy.     Goals:  (to be met in 8 visits)   Pt will report improved ability to sleep without waking due to shoulder pain. MET  Pt will improve shoulder flexion AROM to >175 degrees to be able to reach into overhead cabinets without pain or restriction. MET  Pt will improve shoulder abduction AROM to >175 degrees to improve ability to don deodorant, don/doff shirts, and wash hair   Pt will increase shoulder AROM ER to 85 to be able to reach and fasten seatbelt.   Pt will increase shoulder AROM IR to 75 to be able to reach in back pocket, tuck in shirt, and turn steering wheel without pain.MET  Pt will improve shoulder strength throughout to 4+/5 to improve function with overhead, lifting and work activities. MET  Pt will demonstrate increased mid/low trap strength to 4+/5 to promote improved shoulder mechanics and stabilization with lifting and reaching.  Pt will be independent and compliant with comprehensive HEP to maintain progress achieved in PT.    Plan: Progress scapular retractor and rotator cuff  strengthening to help improve overall posture. D/c from therapy and see neck steps.  Date:10/11/2024                 TX#: 5/5 Date:10/30/2024   Tx#: 6/10 Date:11/1/2024   Tx#: 7/10 Date:11/4/2024   Tx#: 8/10 Date:11/22/2024   Tx#: 8/10   Upper trap, levator and rhomboid strumming Upper trap, levator and rhomboid strumming UBE x6 min forward/backward UBE x6 min forward/backward + hot pack Upper trap, levator and rhomboid strumming + CT rotational mobilization   Standing rows using blue TB 3x12 Standing rows using blue TB 3x12 Standing rows using blue TB 3x12 Standing rows using black TB 3x12 DNF chin tuck 3x12   Horizontal abduction in supine using blue TB 3x12  Horizontal abduction in supine using blue TB 3x12  Horizontal abduction in supine using blue TB 3x12  Horizontal abduction in supine using blue TB 3x12  Horizontal abduction in supine using blue TB 3x12   Serratus punch in supine 3x10 using 4 lbs Hook-lying on foam roller swimmers to improve CT motion 3x10 Hook-lying on foam roller swimmers to improve CT motion 3x10 Upper trap, levator and rhomboid strumming + CT rotational mobilization Scapular retraction using red TB 3x12 in sitting with foam roller at T-spine   DNF chin tuck 3x12 Hook-lying on foam roller prolonged pec stretch w/ diaphragmatic breathing 3x45 sec. Hook-lying on foam roller prolonged pec stretch w/ diaphragmatic breathing 3x45 sec. DNF chin tuck 3x12 Seated T-spine extension over foam roller 2x10   Scapular retraction using red TB 3x12 in standing with foam roller against the wall Scapular retraction using red TB 3x12 in standing with foam roller against the wall Scapular retraction using red TB 3x12 in standing with foam roller against the wall Scapular retraction using red TB 3x12 in standing with foam roller against the wall Upper trap, levator stretches w/ chin tuck 3x20 seconds each      Seated SNAGs for rotation +slight overpressure       DNF chin tuck 3x12 Upper trap, levator  stretches w/ chin tuck 3x20 seconds each    HEP:   - Supine Shoulder Flexion Extension AAROM with Dowel  - 1 x daily - 5-6 x weekly - 3 sets - 10 reps  - Correct Seated Posture  - 1 x daily - 7 x weekly  - Seated Scapular Retraction  - 1 x daily - 5 x weekly - 3 sets - 10 reps  - Supine Shoulder Horizontal Abduction with Resistance  - 1 x daily - 4 x weekly - 3 sets - 10-12 reps  - Shoulder extension with resistance - Neutral  - 1 x daily - 3-4 x weekly - 3 sets - 10-12 reps  -Sidelying Open Book Thoracic Rotation with Knee on Foam Roll  - 1 x daily - 3-5 x weekly - 2-3 sets - 10 reps    Charges: TherX(2) MT(1)     Total Timed Treatment: 43 min  Total Treatment Time: 45 min

## 2024-12-04 ENCOUNTER — OFFICE VISIT (OUTPATIENT)
Dept: FAMILY MEDICINE CLINIC | Facility: CLINIC | Age: 64
End: 2024-12-04
Payer: COMMERCIAL

## 2024-12-04 ENCOUNTER — TELEPHONE (OUTPATIENT)
Dept: FAMILY MEDICINE CLINIC | Facility: CLINIC | Age: 64
End: 2024-12-04

## 2024-12-04 VITALS
BODY MASS INDEX: 34.18 KG/M2 | RESPIRATION RATE: 18 BRPM | SYSTOLIC BLOOD PRESSURE: 130 MMHG | DIASTOLIC BLOOD PRESSURE: 70 MMHG | OXYGEN SATURATION: 99 % | HEART RATE: 70 BPM | TEMPERATURE: 97 F | WEIGHT: 200.19 LBS | HEIGHT: 64 IN

## 2024-12-04 DIAGNOSIS — M54.2 NECK PAIN: ICD-10-CM

## 2024-12-04 DIAGNOSIS — Z23 NEED FOR VACCINATION: ICD-10-CM

## 2024-12-04 DIAGNOSIS — M25.512 ACUTE PAIN OF LEFT SHOULDER: Primary | ICD-10-CM

## 2024-12-04 PROCEDURE — 99214 OFFICE O/P EST MOD 30 MIN: CPT | Performed by: FAMILY MEDICINE

## 2024-12-04 PROCEDURE — 3078F DIAST BP <80 MM HG: CPT | Performed by: FAMILY MEDICINE

## 2024-12-04 PROCEDURE — 3008F BODY MASS INDEX DOCD: CPT | Performed by: FAMILY MEDICINE

## 2024-12-04 PROCEDURE — 3075F SYST BP GE 130 - 139MM HG: CPT | Performed by: FAMILY MEDICINE

## 2024-12-04 NOTE — TELEPHONE ENCOUNTER
Patient states MRI should include neck. She's not able to get in until 12/13 and wanted to know if we call, could she get in sooner?

## 2024-12-04 NOTE — PROGRESS NOTES
Subjective:   Marilyn Post is a 64 year old female who presents for Follow - Up (Patient here for neck and left shoulder pain follow up, patient states went through physical therapy and it did not seem to help with the pain. Patient would like to discuss getting an MRI.)   Patient states that she went to PT for the shoulder and neck pain. Patient states that she did get some relief and some, although a very small, amount of movement back she is still having pain and limited range of motion. Patient states that her physical therapist stated that she wasn't getting much improvement and should seek additional imaging or ortho consult.   History/Other:    Chief Complaint Reviewed and Verified  Nursing Notes Reviewed and   Verified  Tobacco Reviewed  Allergies Reviewed  Medications Reviewed    Problem List Reviewed  Medical History Reviewed  Surgical History   Reviewed  OB Status Reviewed  Family History Reviewed  Social History   Reviewed         Tobacco:  She has never smoked tobacco.    Current Outpatient Medications   Medication Sig Dispense Refill    LORazepam 1 MG Oral Tab Take 1 tablet (1 mg total) by mouth nightly as needed. AT BEDTIME 90 tablet 1    rosuvastatin 10 MG Oral Tab Take 1 tablet (10 mg total) by mouth nightly. 30 tablet 0    sertraline 100 MG Oral Tab Take 1 tablet (100 mg total) by mouth daily. 90 tablet 0    Esomeprazole Magnesium (NEXIUM 24HR OR) daily.      Dicyclomine HCl 20 MG Oral Tab Take 1 tablet (20 mg total) by mouth 4 (four) times daily as needed. 120 tablet 5         Review of Systems:  Review of Systems   Constitutional: Negative.    Respiratory: Negative.     Cardiovascular: Negative.    Gastrointestinal: Negative.    Musculoskeletal:  Positive for arthralgias (shoulder pain), myalgias and neck stiffness.   Skin: Negative.    Neurological: Negative.        Objective:   /70 (BP Location: Left arm, Patient Position: Sitting, Cuff Size: adult)   Pulse 70   Temp  97.2 °F (36.2 °C) (Temporal)   Resp 18   Ht 5' 4\" (1.626 m)   Wt 200 lb 3.2 oz (90.8 kg)   SpO2 99%   BMI 34.36 kg/m²  Estimated body mass index is 34.36 kg/m² as calculated from the following:    Height as of this encounter: 5' 4\" (1.626 m).    Weight as of this encounter: 200 lb 3.2 oz (90.8 kg).  Physical Exam  Constitutional:       Appearance: She is well-developed.   HENT:      Head: Normocephalic and atraumatic.      Nose: Nose normal.      Mouth/Throat:      Mouth: Mucous membranes are moist.      Pharynx: Oropharynx is clear.   Eyes:      Conjunctiva/sclera: Conjunctivae normal.   Cardiovascular:      Rate and Rhythm: Normal rate.   Pulmonary:      Effort: Pulmonary effort is normal.   Musculoskeletal:         General: Tenderness present.      Left shoulder: Tenderness present. Decreased range of motion. Decreased strength.      Cervical back: Tenderness present. Decreased range of motion.   Skin:     General: Skin is warm and dry.   Neurological:      General: No focal deficit present.      Mental Status: She is alert and oriented to person, place, and time.   Psychiatric:         Mood and Affect: Mood normal.         Behavior: Behavior normal.         Thought Content: Thought content normal.         Judgment: Judgment normal.           Assessment & Plan:   1. Acute pain of left shoulder (Primary)  Comments:  failed PT minimal increase in movement and rom  Orders:  -     MRI SHOULDER, LEFT (CPT=73221); Future; Expected date: 12/04/2024  -     Ortho Referral - In Network  2. Neck pain  Comments:  failed PT minmal relief with PT.  Orders:  -     MRI NECK (CPT=70540); Future; Expected date: 12/04/2024  3. Need for vaccination  -     High Dose Fluzone trivalent influenza, 65 yrs+ PFS [89091]    Please follow up with PCP for annual physical.     BURAK Hearn, 12/4/2024, 10:15 AM

## 2024-12-06 NOTE — TELEPHONE ENCOUNTER
Dionicio Reece, BURAK  You16 minutes ago (8:44 AM)       Order placed, cannot call to make it sooner.

## 2024-12-11 ENCOUNTER — PATIENT MESSAGE (OUTPATIENT)
Dept: FAMILY MEDICINE CLINIC | Facility: CLINIC | Age: 64
End: 2024-12-11

## 2024-12-11 ENCOUNTER — PATIENT MESSAGE (OUTPATIENT)
Dept: ADMINISTRATIVE | Age: 64
End: 2024-12-11

## 2024-12-11 DIAGNOSIS — M54.2 NECK PAIN: Primary | ICD-10-CM

## 2024-12-11 NOTE — TELEPHONE ENCOUNTER
MRI NECK (CPT=70540)     TIFFANIE  online for status     Referral #: [id number]      Scheduled For: 12/12/2024    Status: pending authorization    Case Number: [id number]        Patient notified of pending status via "[employer].     Appt Desk > Noted        MRI SHOULDER, LEFT (CPT=73221)     TIFFANIE  online for status     Referral #: [id number]     Scheduled For: 12/12/2024    Status: pending authorization    Case Number: [id number]      Patient notified of pending status via "[employer].     Appt Desk > Noted

## 2024-12-12 ENCOUNTER — HOSPITAL ENCOUNTER (OUTPATIENT)
Dept: MRI IMAGING | Age: 64
Discharge: HOME | End: 2024-12-12
Attending: FAMILY MEDICINE
Payer: COMMERCIAL

## 2024-12-12 DIAGNOSIS — M54.2 NECK PAIN: ICD-10-CM

## 2024-12-12 NOTE — TELEPHONE ENCOUNTER
Please review patient MRI requests    Authorization has been initiated and currently pending review    MRI NECK (CPT=70540)    Referral #: [id number]    MRI SHOULDER, LEFT (CPT=73221)   Referral #: [id number]       A newly exam is being request for authorization   MRI SPINE CERVICAL (CPT=72141) (Referral # [id number] )    Please advice if seeking authorization for both    MRI NECK (CPT=70540)  and MRI SPINE CERVICAL (CPT=72141)

## 2024-12-13 NOTE — TELEPHONE ENCOUNTER
MRI SPINE CERVICAL (CPT=72141)     Incoming message received  from clinical staff, requesting  pa     Called TIFFANIE ([phone]) spoke to Donna HOFF who initiated PA, the following case is authorized       Authorization: [id number]. This request is only valid from 12/13/2024 to 6/11/2025.

## 2024-12-13 NOTE — TELEPHONE ENCOUNTER
MRI SHOULDER, LEFT (CPT=73221)     TIFFANIE: [facility] fax online, the following request is authorized    Authorization: [id number]. This request is only valid from 12/9/2024 to 6/7/2025.

## 2024-12-16 ENCOUNTER — TELEPHONE (OUTPATIENT)
Dept: FAMILY MEDICINE CLINIC | Facility: CLINIC | Age: 64
End: 2024-12-16

## 2024-12-16 NOTE — TELEPHONE ENCOUNTER
Patient called and said she was set up to have her MRI done and when she arrived there she was told it had not been approved by her insurance.  She said she had to reschedule.  She said Dionicio told her to call the office if she had any problems getting an appointment and she had to reschedule into January.  She would like Dionicio to get her in sooner because if she waits until January she will have to start all over with her deductible.

## 2024-12-17 NOTE — TELEPHONE ENCOUNTER
Per referral department, MRI is approved but patient is stating she can't get in until January and she states that you told her if she couldn't get it before the end of the year to call the office to get in sooner.

## 2024-12-23 ENCOUNTER — HOSPITAL ENCOUNTER (OUTPATIENT)
Dept: MRI IMAGING | Facility: HOSPITAL | Age: 64
Discharge: HOME | End: 2024-12-23
Attending: FAMILY MEDICINE
Payer: COMMERCIAL

## 2024-12-23 ENCOUNTER — SPINE CENTER NAVIGATION (OUTPATIENT)
Age: 64
End: 2024-12-23

## 2024-12-23 DIAGNOSIS — M50.30 DDD (DEGENERATIVE DISC DISEASE), CERVICAL: Primary | ICD-10-CM

## 2024-12-23 DIAGNOSIS — M48.00 SPINAL STENOSIS, UNSPECIFIED SPINAL REGION: ICD-10-CM

## 2024-12-23 PROCEDURE — 72141 MRI NECK SPINE W/O DYE: CPT | Performed by: FAMILY MEDICINE

## 2024-12-23 NOTE — PROGRESS NOTES
Spine Center Referral Navigation Encounter Note    Referred by: BURAK Hearn    Imaging: MRI Cervical Spine   If imaging done at an external facility, instructed patient to bring disc of MRI to appointment.     Previously Seen Spine Care Providers: None    Referred to: HELEN Estrella in Pain Management     Information below is patient reported.     [facility]  Are you currently experiencing any of the following symptoms?      No    Is your condition due to an injury that occurred at work or is your care for this condition being coordinated by Worker's Compensation?      No    Are you looking for a second surgical opinion?      No    Have you had surgery on your spine (neck or back) in the last 12 months?      No    In the last several weeks, have you experienced weakness or balance issues that have caused falls or difficulty lifting your legs or feet (lower body) and/or weakness that has caused you to drop items or caused changes in handwriting (upper body)?      No    In the last 3 months, have you had spine injections AND physical therapy for your back or neck that did not improve your symptoms?      No    : Patient needs to be seen by non-surgical team. Does patient require a new visit or established visit?      New patient visit    Are you closer to [place] or [facility]?      [facility]         12/23- Lvm requesting call back to discuss.    12/23- Patient called back and we scheduled appt.

## 2024-12-30 ENCOUNTER — HOSPITAL ENCOUNTER (OUTPATIENT)
Dept: GENERAL RADIOLOGY | Facility: HOSPITAL | Age: 64
Discharge: HOME | End: 2024-12-30
Attending: NURSE PRACTITIONER
Payer: COMMERCIAL

## 2024-12-30 ENCOUNTER — OFFICE VISIT (OUTPATIENT)
Dept: PAIN CLINIC | Facility: CLINIC | Age: 64
End: 2024-12-30
Payer: COMMERCIAL

## 2024-12-30 VITALS
WEIGHT: 205 LBS | SYSTOLIC BLOOD PRESSURE: 130 MMHG | HEART RATE: 70 BPM | DIASTOLIC BLOOD PRESSURE: 78 MMHG | BODY MASS INDEX: 35 KG/M2 | OXYGEN SATURATION: 99 %

## 2024-12-30 DIAGNOSIS — M48.02 DEGENERATIVE CERVICAL SPINAL STENOSIS: ICD-10-CM

## 2024-12-30 DIAGNOSIS — M50.323 OTHER CERVICAL DISC DEGENERATION AT C6-C7 LEVEL: ICD-10-CM

## 2024-12-30 DIAGNOSIS — M50.323 OTHER CERVICAL DISC DEGENERATION AT C6-C7 LEVEL: Primary | ICD-10-CM

## 2024-12-30 PROCEDURE — 72052 X-RAY EXAM NECK SPINE 6/>VWS: CPT | Performed by: NURSE PRACTITIONER

## 2024-12-30 PROCEDURE — 99214 OFFICE O/P EST MOD 30 MIN: CPT | Performed by: NURSE PRACTITIONER

## 2024-12-30 PROCEDURE — 3075F SYST BP GE 130 - 139MM HG: CPT | Performed by: NURSE PRACTITIONER

## 2024-12-30 PROCEDURE — 3078F DIAST BP <80 MM HG: CPT | Performed by: NURSE PRACTITIONER

## 2024-12-30 RX ORDER — METHYLPREDNISOLONE 4 MG/1
TABLET ORAL
Qty: 1 EACH | Refills: 0 | Status: SHIPPED | OUTPATIENT
Start: 2024-12-30

## 2024-12-30 NOTE — PATIENT INSTRUCTIONS
Refill policies:    Allow 2-3 business days for refills; controlled substances may take longer.  Contact your pharmacy at least 5 days prior to running out of medication and have them send an electronic request or submit request through the “request refill” option in your [employer] account.  Refills are not addressed on weekends; covering physicians do not authorize routine medications on weekends.  No narcotics or controlled substances are refilled after noon on Fridays or by on call physicians.  By law, narcotics must be electronically prescribed.  A 30 day supply with no refills is the maximum allowed.  If your prescription is due for a refill, you may be due for a follow up appointment.  To best provide you care, patients receiving routine medications need to be seen at least once a year.  Patients receiving narcotic/controlled substance medications need to be seen at least once every 3 months.  In the event that your preferred pharmacy does not have the requested medication in stock (e.g. Backordered), it is your responsibility to find another pharmacy that has the requested medication available.  We will gladly send a new prescription to that pharmacy at your request.    Scheduling Tests:    If your physician has ordered radiology tests such as MRI or CT scans, please contact Central Scheduling at [phone] right away to schedule the test.  Once scheduled, the [facility] Centralized Referral Team will work with your insurance carrier to obtain pre-certification or prior authorization.  Depending on your insurance carrier, approval may take 3-10 days.  It is highly recommended patients assure they have received an authorization before having a test performed.  If test is done without insurance authorization, patient may be responsible for the entire amount billed.      Precertification and Prior Authorizations:  If your physician has recommended that you have a procedure or additional testing performed the [facility]  Centralized Referral Team will contact your insurance carrier to obtain pre-certification or prior authorization.    You are strongly encouraged to contact your insurance carrier to verify that your procedure/test has been approved and is a COVERED benefit.  Although the [facility] Centralized Referral Team does its due diligence, the insurance carrier gives the disclaimer that \"Although the procedure is authorized, this does not guarantee payment.\"    Ultimately the patient is responsible for payment.   Thank you for your understanding in this matter.  Paperwork Completion:  If you require FMLA or disability paperwork for your recovery, please make sure to either drop it off or have it faxed to our office at [phone]. Be sure the form has your name and date of birth on it.  The form will be faxed to our Forms Department and they will complete it for you.  There is a 25$ fee for all forms that need to be filled out.  Please be aware there is a 10-14 day turnaround time.  You will need to sign a release of information (LUZ MARINA) form if your paperwork does not come with one.  You may call the Forms Department with any questions at [phone].  Their fax number is [phone].

## 2024-12-30 NOTE — PROGRESS NOTES
Subjective:   Patient ID: Marilyn Post is a 64 year old female.    HPI    History/Other:   Review of Systems  Current Outpatient Medications   Medication Sig Dispense Refill    ofloxacin 0.3 % Otic Solution Place 5 drops into the left ear daily. 1 each 4    LORazepam 1 MG Oral Tab Take 1 tablet (1 mg total) by mouth nightly as needed. AT BEDTIME 90 tablet 1    rosuvastatin 10 MG Oral Tab Take 1 tablet (10 mg total) by mouth nightly. 30 tablet 0    sertraline 100 MG Oral Tab Take 1 tablet (100 mg total) by mouth daily. 90 tablet 0    Esomeprazole Magnesium (NEXIUM 24HR OR) daily.      Dicyclomine HCl 20 MG Oral Tab Take 1 tablet (20 mg total) by mouth 4 (four) times daily as needed. 120 tablet 5     Allergies:Allergies[1]    Objective:   Physical Exam  Constitutional:              Assessment & Plan:   No diagnosis found.    No orders of the defined types were placed in this encounter.      Meds This Visit:  Requested Prescriptions      No prescriptions requested or ordered in this encounter       Imaging & Referrals:  None      Location of Pain: right side of neck and shoulder, shoots down arm to elbow    Date Pain Began: 8/7/24          Work Related:   No        Receiving Work Comp/Disability:   No    Numeric Rating Scale:  Pain at Present:  3-4                                                                                                            (No Pain) 0  to  10 (Worst Pain)                 Minimum Pain:   3  Maximum Pain  7    Distribution of Pain:    left    Quality of Pain:   aching, throbbing, tingling, and cramping, shooting    Origin of Pain:    Repetitive motion, Traumatic Accident, and Other fall    Aggravating Factors:    Other checking when turn around, turning head left, bending over, lean forward    Past Treatments for Current Pain Condition:   Physical Therapy    Prior diagnostic testing for your pain:  Xray and MRI         [1]   Allergies  Allergen Reactions    Erythromycin OTHER (SEE  COMMENTS)    Benadryl [Altaryl] RASH    Clindamycin RASH    Demerol HALLUCINATION

## 2024-12-30 NOTE — PROGRESS NOTES
Patient: Marilyn Post  Medical Record Number: [id number]  Site: [place], [place]  Referring Physician: Spine center navigation  PCP: Dr. Elizabeth        Thank you very much for requesting this consultation. I had the opportunity to evaluate and initiate care for your patient today, as per your request.    HISTORY OF CHIEF COMPLAINT:      Marilyn Post is a 64 year old female, who complains of sided neck pain with intermittent left upper extremity radicular symptoms.    Patient denies any right upper extremity symptoms.  Patient denies any symptoms in her lower extremities.  Patient denies any loss of bowel or bladder control    Pain history per primary care provider visit September 11, 2024: Patient had reported a fall August 7, 2024 in a parking lot and landed on her left shoulder and left side of her face.  Patient was having pain in the shoulder and wrist on the left side.  She had reported that she fell with an outstretched arm and it was not getting better.  Patient was given baclofen 10 mg for bedtime and had x-ray of shoulder and wrist and sent to physical therapy.  X-rays were negative.  Patient did move forward with physical therapy.  Patient was given orders for an MRI of the left shoulder and neck.  Patient did have MRI of cervical spine December 23, 2024.    Patient is rating her current pain at a 4 out of 10.  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  Dropping things: No    Aggravating Factors: Relieving Factors:   Working, turning neck to the left Ice, heat, massage, occasional Advil dual action     Past Treatment Attempted/Patient’s Response:  Treatment up to this time has included:  Evaluation: PCP for left neck and left shoulder pain  NSAIDS: Using Advil dual action: Avoids due to history of GERD  Narcotic use: Not  applicable  Physical therapy: completed PT without relief of symptoms  Spinal injections: Has had lumbar epidural steroid injection in the past with relief  Others: Heat, ice         Past Medical History:    Anxiety state, unspecified    Cancer of cervix (HCC)    Esophageal reflux      Past Surgical History:   Procedure Laterality Date    Cholecystectomy  2004    D & c  ,    Missed AB          x 3    Other surgical history  12    implantation of cervical sleeve for high dose brachytherapy    Removal of tonsils,11+ y/o  1971    Tonsillectomy        Family History   Problem Relation Age of Onset    Hypertension Mother     Cancer Father         Larynx       Social History     Socioeconomic History    Marital status:    Tobacco Use    Smoking status: Never     Passive exposure: Yes    Smokeless tobacco: Never   Vaping Use    Vaping status: Never Used   Substance and Sexual Activity    Alcohol use: Yes     Alcohol/week: 0.0 standard drinks of alcohol     Comment: Socially    Drug use: No    Sexual activity: Yes     Partners: Male      Current Medications:  Current Outpatient Medications   Medication Sig Dispense Refill    ofloxacin 0.3 % Otic Solution Place 5 drops into the left ear daily. 1 each 4    LORazepam 1 MG Oral Tab Take 1 tablet (1 mg total) by mouth nightly as needed. AT BEDTIME 90 tablet 1    rosuvastatin 10 MG Oral Tab Take 1 tablet (10 mg total) by mouth nightly. 30 tablet 0    sertraline 100 MG Oral Tab Take 1 tablet (100 mg total) by mouth daily. 90 tablet 0    Esomeprazole Magnesium (NEXIUM 24HR OR) daily.      Dicyclomine HCl 20 MG Oral Tab Take 1 tablet (20 mg total) by mouth 4 (four) times daily as needed. 120 tablet 5        Functional Assessment: Patient reports that they are able to complete all of their ADL's such as eating, bathing, using the toilet, dressing and getting up from a bed or a chair independently.    Work History:  The patient currently works as a dental  hygienist.    REVIEW OF SYSTEMS:   GENERAL HEALTH: No fevers, chills, recent weight loss or unremitting night pain.  SKIN: No history of skin rashes.  EYES: No blurred vision, double vision or changes in vision.  HEENT: No swallowing or hearing difficulties.  RESPIRATORY: No shortness of breath.   CARDIOVASCULAR: No chest pain or palpitations.  History of hyperlipidemia  GI: Denies nausea, vomiting, constipation, diarrhea; no rectal bleeding; no heartburn, no melana.  History of GERD  : No dysuria, urgency or frequency; no hematuria.  NEURO: No balance problems, no seizure problems, no depression.  PSYCHE: no symptoms of depression or anxiety.  History of anxiety  HEMATOLOGY: denies hx anemia; denies bruising or excessive bleeding.  ENDOCRINE: denies excessive thirst or urination; denies unexpected wt gain or wt loss.  MUSCULOSKELETAL: DENIES:, Muscle cramps, Swelling of joints, History of arthritis, Fibromyalgia, Osteoporosis, Hardware, Deformity, Limited Range of motion, Crepitation, Gout, Heel spurs recent history of left shoulder pain and left neck pain, history of lumbar ago, osteopenia, vitamin D deficiency  ALLERGY/IMM.: denies food or seasonal allergies.  No history of infectious disease exposure.  History of cervical cancer    Radiology/Lab Test Reviewed:    PROCEDURE:  MRI SPINE CERVICAL (CPT=72141)     COMPARISON:  None.     INDICATIONS:  M54.2 Neck pain     TECHNIQUE:  Multiplanar T1 and T2 weighted images including fat suppression sequences.  Images acquired in sagittal and axial planes.       PATIENT STATED HISTORY: (As transcribed by Technologist)  Patient complians of posterior and left lateral neck pain radiating to left shoulder. Patient fell August 2024      FINDINGS:  There is straightening of the normal cervical lordosis with anatomic alignment.  Vertebral body heights are well-maintained.  Mild degenerative disc space loss, disc desiccation, endplate spurring, and degenerative endplate marrow  signal changes most  pronounced at C6-7.  No focal worrisome marrow signal abnormality is seen.     Developmental narrowing of the cervical spinal canal noted.  The cervical spinal cord has a normal course and caliber.  No focal cord signal abnormality is seen.  No focal mass or fluid collection is seen in the cervical spinal canal.  The paraspinal  soft tissues are unremarkable.  The craniocervical junction is unremarkable.     C2-3:  Minimal central disc protrusion. There is no significant spinal canal or neural foraminal stenosis.     C3-4: There is a small posterior disk osteophyte complex with minimal uncovertebral and facet joint degenerative changes.  There is mild spinal canal stenosis with minimal flattening the ventral spinal cord.  Moderate right neural foraminal stenosis.     C4-5: There is a minimal posterior disk osteophyte complex with minimal uncovertebral and facet joint degenerative changes.  There is mild spinal canal stenosis with minimal flattening the ventral spinal cord.  Moderate bilateral neural foraminal  stenosis.     C5-6: There is a minimal posterior disk osteophyte complex with minimal uncovertebral and facet joint degenerative changes.  There is no significant spinal canal stenosis.  Mild bilateral neural foraminal stenosis.     C6-7: There is a posterior disk osteophyte complex with mild-to-moderate uncovertebral and facet joint degenerative changes.  There is mild-to-moderate spinal canal stenosis with mild flattening the ventral spinal cord.  Moderate to severe bilateral  neural foraminal stenosis.     C7-T1:  There is no significant abnormality.                      Impression   CONCLUSION:       1. Multilevel degenerative changes the cervical spine are most pronounced at the C6-7 level.  Developmental narrowing of the cervical spinal canal contributes to multilevel spinal canal stenosis.     2. Mild-to-moderate spinal canal stenosis at C6-7.  Mild spinal canal stenosis at C3-4 and  C4-5.     3. Moderate right neural foraminal stenosis at C3-4.  Moderate bilateral neural foraminal stenosis at C4-5.  Mild bilateral neural foraminal stenosis at C5-6.  Moderate to severe bilateral neural foraminal stenosis at C6-7.     4. No focal spinal cord signal abnormality identified.       Please see above for further details.     LOCATION:  [facility]        Dictated by (CST): Gal Armenta MD on 12/23/2024 at 10:30 AM      Finalized by (CST): Gal Armenta MD on 12/23/2024 at 10:33 AM       Result History    MRI SPINE CERVICAL (CPT=72141) (Order #[id number]) on 12/23/2024 - Order Result History Report         CBC:    Lab Results   Component Value Date    WBC 5.6 04/05/2023    WBC 4.7 07/05/2022    WBC 5.3 07/04/2022     Lab Results   Component Value Date    HEMOGLOBIN 14.4 10/19/2019    HEMOGLOBIN 13.9 02/04/2015    HEMOGLOBIN 13.8 10/31/2013     Lab Results   Component Value Date     04/05/2023    .0 07/05/2022    .0 07/04/2022             PHYSICAL EXAMIMATION   PHYSICAL EXAMINATION: Marilyn Post is a 64 year old  female who is observed sitting comfortably on a chair in the exam room alert and oriented times three. She looks younger than her stated age.    Ht Readings from Last 1 Encounters:   12/04/24 64\"     Wt Readings from Last 1 Encounters:   12/04/24 200 lb 3.2 oz (90.8 kg)     The patient is well developed, well nourished, normal body habitus, well muscled. She moves independently from sitting to standing with ease.       Coordination:  Well coordinated; able to engage in rapid alternating movements bilateral upper extremities    Tandem Walk: Able    ROM Cervical Spine:  See chart below:  Motion Right (+ or -) Left (+ or -)   Cervical flexion - +   Cervical extension - +   Cervical lateral bending - +   Cervical rotation - ++     Integument:  Skin over area of cervical spine intact; no erythema, rashes, excoriations, lesions noted    Palpation:  See chart  below:  Palpation of Right (+ or -) Left (+ or -)   Cervical Facets - +   Thoracic Facets - -   Paraspinal - +   Trapezius - +   Scapula - -   Occipital - -     DTR:  DTR grossly intact throughout bilateral upper extremities, 2/4 throughout    Strength:  Strength of bilateral upper extremities grossly intact; 5/5 throughout    Sensation:  Normal sensation noted to light touch and pressure throughout bilateral upper extremities.    Tests:  Test Right (+ or -) Left (+ or -)   Spurling - -   Cochran’s Test - -   Clonus - -     HEAD/NECK: Head is normocephalic, neck supple  EYES: EOMI, DAPHNE  LYMPH EXAM: There is no lymph edema in either lower extremity.  VASCULAR EXAM: Radial pulses are normal bilaterally, with good distal perfusion. No clubbing or cyanosis.  MUSCULOSKELETAL: Smooth, pain-free ROM to bilateral shoulders,elbows, wrists and digits.    Patient Education: Patient was advised to continue normal activities as tolerated and was advised against any form of bedrest, since recent guidelines promote and encourage physical activity for improvment of functionality and overall pain.  (Family Practice Vol. 16, No. 1, 39-12Â© [employer] 1999 ), Patient was given brochure,website information, and handouts., Patient was shown interactive content, shown and explained procedure on spinal model..        MEDICAL DECISION MAKING:   Impression:     ICD-10-CM    1. Other cervical disc degeneration at C6-C7 level  M50.323 XR CERVICAL SPINE COMPLETE W/FLEX + EXT (CPT=72052)      2. Degenerative cervical spinal stenosis  M48.02         Patient is a 64-year-old female with a history of a fall August 7 with an outstretched left arm.  Patient reports that since then she has had left-sided neck pain radiating into the left shoulder and intermittently down the left upper extremity to her thumb and index.  Patient has completed a course of physical therapy without resolution of symptoms.  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  Patient denies any symptoms down the right.  She did have a cervical MRI with evidence of disc degeneration at C6-7 with moderate spinal stenosis.  She does have some straightening of the normal cervical curve.  She is having some myofascial pain.  We did discuss a course of treatment including Medrol Dosepak, cervical rest device to help with her myofascial pain and a cervical epidural steroid injection with a left bias for symptom management.  Risks and benefits of the epidural were discussed with the patient and she does wish to proceed.    This patient has not had any cervical x-rays with flexion-extension views and symptoms are worse with rotation and extension she will have x-rays done today.    We did discuss that if she does not have good relief of her symptoms in the upper extremity pain is very mild we could consider left sided cervical medial branch blocks staging towards radiofrequency as patient reports 75% of her symptoms are in the neck and 25% in the left upper extremity.    Plan:   > Medrol Dosepak take as prescribed  > Okay to use Tylenol products while using Medrol but no NSAIDs  > We will request authorization for cervical epidural steroid injection with a left bias  > Patient will follow-up after injection therapy to evaluate response  > If no significant change in symptoms and patient continues with majority symptoms in the left side of neck may consider left median branch blocks staging towards radiofrequency  > Encouraged patient to continue home exercises, stretching, heat, ice and cervical rest device for mild traction at end of day      The patient indicates understanding of these issues and agrees to the plan.      Thank you very much.           Id#[id number]

## 2025-01-08 ENCOUNTER — TELEPHONE (OUTPATIENT)
Dept: PAIN CLINIC | Facility: CLINIC | Age: 65
End: 2025-01-08

## 2025-01-08 NOTE — TELEPHONE ENCOUNTER
Prior authorization request completed for: FLAQUITA rider BIAS  Authorization # [id number]  Tracking num# [id number]  Authorization dates: 1/8/25-7/7/25  CPT codes approved: 83261  Number of visits/dates of service approved: 1  Physician: perfecto  Location: [facility]     Approval came though TIFFANIE    Patient can be scheduled. Routed to Navigator.

## 2025-01-30 DIAGNOSIS — E78.00 PURE HYPERCHOLESTEROLEMIA: ICD-10-CM

## 2025-01-31 RX ORDER — ROSUVASTATIN CALCIUM 10 MG/1
10 TABLET, COATED ORAL NIGHTLY
Qty: 30 TABLET | Refills: 0 | Status: SHIPPED | OUTPATIENT
Start: 2025-01-31

## 2025-01-31 RX ORDER — SERTRALINE HYDROCHLORIDE 100 MG/1
100 TABLET, FILM COATED ORAL DAILY
Qty: 90 TABLET | Refills: 0 | Status: SHIPPED | OUTPATIENT
Start: 2025-01-31

## 2025-02-27 ENCOUNTER — APPOINTMENT (OUTPATIENT)
Dept: GENERAL RADIOLOGY | Facility: HOSPITAL | Age: 65
End: 2025-02-27
Attending: ANESTHESIOLOGY
Payer: COMMERCIAL

## 2025-02-27 ENCOUNTER — HOSPITAL ENCOUNTER (OUTPATIENT)
Facility: HOSPITAL | Age: 65
Setting detail: HOSPITAL OUTPATIENT SURGERY
Discharge: HOME | End: 2025-02-27
Attending: ANESTHESIOLOGY | Admitting: ANESTHESIOLOGY
Payer: COMMERCIAL

## 2025-02-27 VITALS
DIASTOLIC BLOOD PRESSURE: 79 MMHG | HEIGHT: 64 IN | TEMPERATURE: 97 F | BODY MASS INDEX: 35 KG/M2 | OXYGEN SATURATION: 100 % | HEART RATE: 64 BPM | RESPIRATION RATE: 18 BRPM | WEIGHT: 205 LBS | SYSTOLIC BLOOD PRESSURE: 127 MMHG

## 2025-02-27 PROBLEM — M54.12 CERVICAL RADICULITIS: Status: ACTIVE | Noted: 2025-02-27

## 2025-02-27 PROCEDURE — 62321 NJX INTERLAMINAR CRV/THRC: CPT | Performed by: ANESTHESIOLOGY

## 2025-02-27 PROCEDURE — 3E0R33Z INTRODUCTION OF ANTI-INFLAMMATORY INTO SPINAL CANAL, PERCUTANEOUS APPROACH: ICD-10-PCS | Performed by: ANESTHESIOLOGY

## 2025-02-27 RX ORDER — SODIUM CHLORIDE, SODIUM LACTATE, POTASSIUM CHLORIDE, CALCIUM CHLORIDE 600; 310; 30; 20 MG/100ML; MG/100ML; MG/100ML; MG/100ML
100 INJECTION, SOLUTION INTRAVENOUS CONTINUOUS
Status: DISCONTINUED | OUTPATIENT
Start: 2025-02-27 | End: 2025-02-27

## 2025-02-27 RX ORDER — NALOXONE HYDROCHLORIDE 0.4 MG/ML
0.08 INJECTION, SOLUTION INTRAMUSCULAR; INTRAVENOUS; SUBCUTANEOUS AS NEEDED
Status: DISCONTINUED | OUTPATIENT
Start: 2025-02-27 | End: 2025-02-27

## 2025-02-27 RX ORDER — ONDANSETRON 2 MG/ML
4 INJECTION INTRAMUSCULAR; INTRAVENOUS ONCE AS NEEDED
Status: DISCONTINUED | OUTPATIENT
Start: 2025-02-27 | End: 2025-02-27

## 2025-02-27 RX ORDER — DEXAMETHASONE SODIUM PHOSPHATE 10 MG/ML
INJECTION, SOLUTION INTRAMUSCULAR; INTRAVENOUS
Status: DISCONTINUED | OUTPATIENT
Start: 2025-02-27 | End: 2025-02-27

## 2025-02-27 RX ORDER — LIDOCAINE HYDROCHLORIDE 10 MG/ML
INJECTION, SOLUTION EPIDURAL; INFILTRATION; INTRACAUDAL; PERINEURAL
Status: DISCONTINUED | OUTPATIENT
Start: 2025-02-27 | End: 2025-02-27

## 2025-02-27 RX ORDER — SODIUM CHLORIDE 9 MG/ML
INJECTION, SOLUTION INTRAMUSCULAR; INTRAVENOUS; SUBCUTANEOUS
Status: DISCONTINUED | OUTPATIENT
Start: 2025-02-27 | End: 2025-02-27

## 2025-02-27 NOTE — OPERATIVE REPORT
[facility]  Operative Report  2025     Marilyn Post Patient Status:  Hospital Outpatient Surgery    1960 MRN [id number]   Location [facility] Attending Casper Marquez MD   Hosp Day # 0 PCP Suzy Pastor DO     Indication: Marilyn is a 65 year old female with cervical radiculitis    Preoperative Diagnosis:  Other cervical disc degeneration at C6-C7 level [M50.323]  Degenerative cervical spinal stenosis [M48.02]    Postoperative Diagnosis: Same as above.    Procedure performed: CERVICAL EPIDURAL STEROID INJECTION with local    Anesthesia: Local  .    EBL: Less than 1 ml.    Procedure Description:  After reviewing the patient's history and performing a focused physical examination, the diagnosis was confirmed and contraindications such as infection and coagulopathy were ruled out.  Following review of allergies, potential side effects, and complications, including but not necessarily limited to infection, allergic reaction, local tissue breakdown, nerve injury, post-dural puncture headache and paresis, the patient indicated they understood and agreed to proceed.  After obtaining the informed consent, the patient was brought to the procedure room and monitored.      The patient was brought to the procedure room and positioned prone.  After comprehensive monitors were applied, the patient's neck was prepped and draped sterilely.  After local anesthetic was instilled in the skin and subcutaneous tissue, a 20-gauge Tuohy needle was introduced and advanced under fluoroscopy at C7-T1.  The epidural space was reached by using a loss of resistance to air technique. There was no C.S.F. or blood through the needle. After obtaining a good epidurogram by injecting Omnipaque 180 1 mL, a combination of normal saline and dexamethasone 10 mg in total volume of 4 mL was injected.  The needle was then flushed with normal saline 1 mL.  The stylet re-applied.  The needle was withdrawn  with the tip intact.  The patient tolerated the procedure very well and recovered and was discharged to a responsible adult after discharge criteria were met.        Complications: None.    Follow up:  The patient was followed in the pain clinic as needed basis.          Casper Marquez MD

## 2025-02-27 NOTE — H&P
History & Physical Examination    Patient Name: Marilyn Post  MRN: [id number]  CSN: [id number]  YOB: 1960    Pre-Operative Diagnosis:  Other cervical disc degeneration at C6-C7 level [M50.323]  Degenerative cervical spinal stenosis [M48.02]    Present Illness: Cervical radiculitis    ASA: 2  MP class: 1  Sedation: Local      Prescriptions Prior to Admission[1]  Current Facility-Administered Medications   Medication Dose Route Frequency    lactated ringers infusion  100 mL/hr Intravenous Continuous    ondansetron (Zofran) 4 MG/2ML injection 4 mg  4 mg Intravenous Once PRN       Allergies: Allergies[2]    Past Medical History:    Anxiety state, unspecified    Cancer of cervix (HCC)    Esophageal reflux     Past Surgical History:   Procedure Laterality Date    Cholecystectomy      D & c  ,    Missed AB          x 3    Other surgical history  12    implantation of cervical sleeve for high dose brachytherapy    Removal of tonsils,11+ y/o  1971    Tonsillectomy       Family History   Problem Relation Age of Onset    Hypertension Mother     Cancer Father         Larynx      Social History     Tobacco Use    Smoking status: Never     Passive exposure: Yes    Smokeless tobacco: Never   Substance Use Topics    Alcohol use: Yes     Alcohol/week: 0.0 standard drinks of alcohol     Comment: Socially       SYSTEM Check if Review is Normal Check if Physical Exam is Normal If not normal, please explain:   HEENT [x ] [x ]    NECK & BACK [x ] [x ]    HEART [x ] [x ]    LUNGS [x ] [x ]    ABDOMEN [x ] [x ]    UROGENITAL [x ] [x ]    EXTREMITIES [x ] [x ]    OTHER        [ x ] I have discussed the risks and benefits and alternatives with the patient/family.  They understand and agree to proceed with plan of care.  [ x ] I have reviewed the History and Physical done within the last 30 days.  Any changes noted above.    Casper Marquez MD              [1]   Medications Prior to Admission    Medication Sig Dispense Refill Last Dose/Taking    SERTRALINE 100 MG Oral Tab Take 1 tablet (100 mg total) by mouth daily. 90 tablet 0     ROSUVASTATIN 10 MG Oral Tab Take 1 tablet (10 mg total) by mouth nightly. 30 tablet 0     methylPREDNISolone (MEDROL) 4 MG Oral Tablet Therapy Pack Take as directed 1 each 0     ofloxacin 0.3 % Otic Solution Place 5 drops into the left ear daily. 1 each 4     LORazepam 1 MG Oral Tab Take 1 tablet (1 mg total) by mouth nightly as needed. AT BEDTIME 90 tablet 1     Esomeprazole Magnesium (NEXIUM 24HR OR) daily.       Dicyclomine HCl 20 MG Oral Tab Take 1 tablet (20 mg total) by mouth 4 (four) times daily as needed. 120 tablet 5    [2]   Allergies  Allergen Reactions    Erythromycin OTHER (SEE COMMENTS)    Benadryl [Altaryl] RASH    Clindamycin RASH    Demerol HALLUCINATION

## 2025-02-27 NOTE — DISCHARGE INSTRUCTIONS
Home Care Instructions Following Your Pain Procedure     Marilyn,  It has been a pleasure to have you as our patient. To help you at home, you must follow these general discharge instructions. We will review these with you before you are discharged. It is our hope that you have a complete and uneventful recovery from our procedure.     General Instructions:  What to Expect:  Bandages from your procedure today can be removed when you get home.  Please avoid soaking and/or swimming for 24 hours.  Showering is okay  It is normal to have increased pain symptoms and/or pain at injection site for up to 3-5 days after procedure, you can use heat or ice (20 minutes on 20 minutes off) for comfort.  You may experience some temporary side effects which may include restlessness or insomnia, flushing of the face, or heart palpitations.  Please contact the provider if these symptoms do not resolve within 3-4 days.  Lightheadedness or nausea may occur and should resolve within 24 to 48 hours.  If you develop a headache after treatment, rest, drink fluids (with caffeine, if possible) and take mild over-the-counter pain medication.  If the headache does not improve with the above treatment, contact the physician.  Home Medications:  Resume all previously prescribed medication.  Please avoid taking NSAIDs (Non-Steriodal Anti-Inflammatory Drugs) such as:  Ibuprofen ( Advil, Motrin) Aleve (Naproxen), Diclofenac, Meloxicam for 6 hours after procedure.   If you are on Coumadin (Warfarin) or any other anti-coagulant (or \"blood thinning\") medication such as Plavix (Clopidogrel), Xarelto (Rivaroxaban), Eliquis (Apixaban), Effient (Prasugrel) etc., restart on the following day from the procedure unless otherwise directed by your provider.  If you are a diabetic, please increase the frequency of your glucose monitoring after the procedure as steroids may cause a temporary (2-3 day) increase in your blood sugar.  Contact your primary care  physician if your blood sugar remains elevated as you may require some medication adjustment.  Diet:  Resume your regular diet as tolerated.  Activity:  We recommend that you relax and rest during the rest of your procedure day.  If you feel weakness in your arms or legs do not drive.  Follow-up Appointment  Please schedule a follow-up visit within 3 to 4 weeks after your last procedure date.  Question or Concerns:  Feel free to call our office with any questions or concerns at [phone] (option #2)    Marilyn  Thank you for coming to [facility] for your procedure.  The nurses try very hard to make sure you receive the best care possible.  Your trust in them as well as us is greatly appreciated.    Thanks so much,   Dr. Casper Marquez

## 2025-02-28 ENCOUNTER — TELEPHONE (OUTPATIENT)
Dept: PAIN CLINIC | Facility: CLINIC | Age: 65
End: 2025-02-28

## 2025-02-28 NOTE — TELEPHONE ENCOUNTER
Follow-up call post pain procedure. Left message informing patient to contact the pain clinic at [phone] option # 3 regarding any questions or concerns about recent pain procedure.       Procedure: FLAQUITA  Date: 2/27/2025  Follow up Visit Scheduled: 3/12/2025 with

## 2025-03-12 ENCOUNTER — OFFICE VISIT (OUTPATIENT)
Dept: PAIN CLINIC | Facility: CLINIC | Age: 65
End: 2025-03-12
Payer: COMMERCIAL

## 2025-03-12 VITALS — HEART RATE: 77 BPM | OXYGEN SATURATION: 96 % | DIASTOLIC BLOOD PRESSURE: 78 MMHG | SYSTOLIC BLOOD PRESSURE: 96 MMHG

## 2025-03-12 DIAGNOSIS — M50.323 OTHER CERVICAL DISC DEGENERATION AT C6-C7 LEVEL: Primary | ICD-10-CM

## 2025-03-12 PROCEDURE — 3074F SYST BP LT 130 MM HG: CPT | Performed by: ANESTHESIOLOGY

## 2025-03-12 PROCEDURE — 3078F DIAST BP <80 MM HG: CPT | Performed by: ANESTHESIOLOGY

## 2025-03-12 PROCEDURE — 99214 OFFICE O/P EST MOD 30 MIN: CPT | Performed by: ANESTHESIOLOGY

## 2025-03-12 PROCEDURE — G2211 COMPLEX E/M VISIT ADD ON: HCPCS | Performed by: ANESTHESIOLOGY

## 2025-03-12 NOTE — PROGRESS NOTES
Last procedure: CERVICAL EPIDURAL STEROID INJECTION with local   Date: 2/27/25    Percentage of relief obtained: 10%  Duration of relief: Worse at first, still in pain, can't tell a difference.     Current Pain Score: 4/10    Narcotic Contract Exp: N/A

## 2025-03-12 NOTE — PATIENT INSTRUCTIONS
Refill policies:    Allow 2-3 business days for refills; controlled substances may take longer.  Contact your pharmacy at least 5 days prior to running out of medication and have them send an electronic request or submit request through the “request refill” option in your [employer] account.  Refills are not addressed on weekends; covering physicians do not authorize routine medications on weekends.  No narcotics or controlled substances are refilled after noon on Fridays or by on call physicians.  By law, narcotics must be electronically prescribed.  A 30 day supply with no refills is the maximum allowed.  If your prescription is due for a refill, you may be due for a follow up appointment.  To best provide you care, patients receiving routine medications need to be seen at least once a year.  Patients receiving narcotic/controlled substance medications need to be seen at least once every 3 months.  In the event that your preferred pharmacy does not have the requested medication in stock (e.g. Backordered), it is your responsibility to find another pharmacy that has the requested medication available.  We will gladly send a new prescription to that pharmacy at your request.    Scheduling Tests:    If your physician has ordered radiology tests such as MRI or CT scans, please contact Central Scheduling at [phone] right away to schedule the test.  Once scheduled, the [facility] Centralized Referral Team will work with your insurance carrier to obtain pre-certification or prior authorization.  Depending on your insurance carrier, approval may take 3-10 days.  It is highly recommended patients assure they have received an authorization before having a test performed.  If test is done without insurance authorization, patient may be responsible for the entire amount billed.      Precertification and Prior Authorizations:  If your physician has recommended that you have a procedure or additional testing performed the [facility]  Centralized Referral Team will contact your insurance carrier to obtain pre-certification or prior authorization.    You are strongly encouraged to contact your insurance carrier to verify that your procedure/test has been approved and is a COVERED benefit.  Although the [facility] Centralized Referral Team does its due diligence, the insurance carrier gives the disclaimer that \"Although the procedure is authorized, this does not guarantee payment.\"    Ultimately the patient is responsible for payment.   Thank you for your understanding in this matter.  Paperwork Completion:  If you require FMLA or disability paperwork for your recovery, please make sure to either drop it off or have it faxed to our office at [phone]. Be sure the form has your name and date of birth on it.  The form will be faxed to our Forms Department and they will complete it for you.  There is a 25$ fee for all forms that need to be filled out.  Please be aware there is a 10-14 day turnaround time.  You will need to sign a release of information (LUZ MARINA) form if your paperwork does not come with one.  You may call the Forms Department with any questions at [phone].  Their fax number is [phone].

## 2025-03-12 NOTE — PROGRESS NOTES
Name: Marilyn Post   : 1960   DOS: 3/12/2025     Pain Clinic Follow Up Visit:     Chief Complaint   Patient presents with    Follow - Up     Follow up after FLAQUITA       Marilyn Post is a 65 year old female with a history of multilevel cervical degenerative disc disease and facet arthropathy here for follow-up.  The patient is status post cervical epidural steroid injection which did not alleviate her left radicular symptoms.  However, patient complains of discomfort in the neck.  This is described as deep and aching made worse bilateral rotation.  Patient has restricted range of motion.    Pt denies any chills, fever, or weakness. There is no bladder or bowel incontinence associated with the pain.    REVIEW OF SYSTEMS:  A ten point review of systems was performed with pertinent positives and negatives in the HPI.    Allergies[1]    Current Outpatient Medications   Medication Sig Dispense Refill    SERTRALINE 100 MG Oral Tab Take 1 tablet (100 mg total) by mouth daily. 90 tablet 0    ROSUVASTATIN 10 MG Oral Tab Take 1 tablet (10 mg total) by mouth nightly. 30 tablet 0    ofloxacin 0.3 % Otic Solution Place 5 drops into the left ear daily. 1 each 4    LORazepam 1 MG Oral Tab Take 1 tablet (1 mg total) by mouth nightly as needed. AT BEDTIME 90 tablet 1    Esomeprazole Magnesium (NEXIUM 24HR OR) daily.      Dicyclomine HCl 20 MG Oral Tab Take 1 tablet (20 mg total) by mouth 4 (four) times daily as needed. 120 tablet 5         EXAM:   BP 96/78 (BP Location: Left arm, Patient Position: Sitting, Cuff Size: adult)   Pulse 77   SpO2 96%   General:  Patient is a(n) 65 year old year old female in no acute distress.  Neurologic:: WNL-Orientation to time, place and person, normal mood & affect, concentration & attention span intact.   Inspection:  Ambulates with well-coordinated, fluid, non-antalgic gait.  Gait is normal.  Neck: Limited lateral rotation towards the left  Cranial nerve: Grossly  intact  Respiratory: Nonlabored  Back: Gait is intact    IMAGES:     Cervical MRI reviewed with evidence of multilevel cervical degenerative disc disease worse at C5-6 and C6/7.  There is facet arthropathy and degenerative changes and cervical osteophytes    ASSESSMENT AND PLAN:     1. Other cervical disc degeneration at C6-C7 level        The patient is a 65-year-old with a history of neck pain due to multilevel cervical degenerative disc disease.  Patient complains primarily of pain with lateral rotation towards the left.  Discussed treatment options including cervical medial branch block followed by radiofrequency ablation.  Patient looking forward.    Current VAS Score: 7  Functional Deficits: Turning of head, driving  Duration of pain symptoms: Chronic  Specific Levels (Only 2 allowed): C5-6, 6-7  Initial treatment or subsequent? (Per area of the body): Initial  Radiculopathy & neurogenic claudication ruled out?:  Yes     Prior Conservative Treatment: Nonsteroidals, home exercise program, chiropractic care, physical therapy      Orders:  Orders Placed This Encounter   Procedures    [facility] PAIN NAVIGATOR         Radiology orders and consultations:None  The patient indicates understanding of these issues and agrees to the plan.  No follow-ups on file.    Casper Marquez MD, 3/12/2025, 10:16 AM              [1]   Allergies  Allergen Reactions    Erythromycin OTHER (SEE COMMENTS)    Benadryl [Altaryl] RASH    Clindamycin RASH    Demerol HALLUCINATION

## 2025-03-18 ENCOUNTER — OFFICE VISIT (OUTPATIENT)
Dept: FAMILY MEDICINE CLINIC | Facility: CLINIC | Age: 65
End: 2025-03-18
Payer: COMMERCIAL

## 2025-03-18 VITALS
BODY MASS INDEX: 33.09 KG/M2 | WEIGHT: 201 LBS | RESPIRATION RATE: 17 BRPM | HEIGHT: 65.35 IN | HEART RATE: 73 BPM | SYSTOLIC BLOOD PRESSURE: 132 MMHG | DIASTOLIC BLOOD PRESSURE: 80 MMHG | OXYGEN SATURATION: 99 %

## 2025-03-18 DIAGNOSIS — H60.62 CHRONIC OTITIS EXTERNA OF LEFT EAR, UNSPECIFIED TYPE: Primary | ICD-10-CM

## 2025-03-18 DIAGNOSIS — H92.02: ICD-10-CM

## 2025-03-18 DIAGNOSIS — H61.23 BILATERAL IMPACTED CERUMEN: ICD-10-CM

## 2025-03-18 PROCEDURE — 3008F BODY MASS INDEX DOCD: CPT

## 2025-03-18 PROCEDURE — 3075F SYST BP GE 130 - 139MM HG: CPT

## 2025-03-18 PROCEDURE — 99213 OFFICE O/P EST LOW 20 MIN: CPT

## 2025-03-18 PROCEDURE — 69209 REMOVE IMPACTED EAR WAX UNI: CPT

## 2025-03-18 PROCEDURE — 3079F DIAST BP 80-89 MM HG: CPT

## 2025-03-18 RX ORDER — OFLOXACIN 3 MG/ML
5 SOLUTION AURICULAR (OTIC) DAILY
Qty: 1 EACH | Refills: 4 | Status: SHIPPED | OUTPATIENT
Start: 2025-03-18

## 2025-03-18 RX ORDER — METHYLPREDNISOLONE 4 MG/1
TABLET ORAL
Qty: 1 EACH | Refills: 0 | Status: SHIPPED | OUTPATIENT
Start: 2025-03-18

## 2025-03-18 NOTE — PROGRESS NOTES
[facility] Family Medicine Office Note  Chief Complaint:   Chief Complaint   Patient presents with    New Patient    Ear Pain     Onset for months, left side, pt states she'll have pain once a month       HPI:   Marilyn Post is a 65 year old female coming in for left ear pain.  She is a current patient of [facility], with her family medicine PCP being out of the [place] office.  She is a new patient to our office.   She states that the left ear pain has been going on for months and occurs about 1 time a month.  She expresses that she has pain that starts at the tip of the ear and goes into the ear near the eardrum.  Denies any skin changes or rashes around the ear or on the ear itself.  Denies any hearing loss.  The pain comes and goes.  She states she was previously prescribed ofloxacin otic solution by her PCP in December and has been using it intermittently.  She states it does help with the pain but has not provided full relief.  Denies any recent travel.    Past Medical History:    Anxiety state, unspecified    Cancer (HCC)    Cervical    Cancer of cervix (HCC)    Depression    Mom     Esophageal reflux    Hyperlipidemia    Obesity     Past Surgical History:   Procedure Laterality Date    Cholecystectomy  2004    Colonoscopy  2013    D & c  ,    Missed AB          x 3    Other surgical history  2012    implantation of cervical sleeve for high dose brachytherapy    Removal of tonsils,11+ y/o  1971    Tonsillectomy       Social History:  Social History     Socioeconomic History    Marital status:    Tobacco Use    Smoking status: Never     Passive exposure: Yes    Smokeless tobacco: Never   Vaping Use    Vaping status: Never Used   Substance and Sexual Activity    Alcohol use: Yes     Alcohol/week: 2.0 standard drinks of alcohol     Types: 1 Glasses of wine, 1 Cans of beer per week     Comment: Socially    Drug use: No    Sexual activity: Yes      Partners: Male   Other Topics Concern    Caffeine Concern No    Exercise Yes    Seat Belt Yes    Special Diet No    Stress Concern Yes     Comment: grandson epilepsy  work mom passed my best friend my house f    Weight Concern Yes     Family History:  Family History   Problem Relation Age of Onset    Hypertension Mother     Cancer Father         Larynx      Allergies:  Allergies   Allergen Reactions    Erythromycin OTHER (SEE COMMENTS)    Benadryl [Altaryl] RASH    Clindamycin RASH    Demerol HALLUCINATION     Current Meds:  Current Outpatient Medications   Medication Sig Dispense Refill    ofloxacin 0.3 % Otic Solution Place 5 drops into the left ear daily. 1 each 4    methylPREDNISolone (MEDROL) 4 MG Oral Tablet Therapy Pack As directed. 1 each 0    SERTRALINE 100 MG Oral Tab Take 1 tablet (100 mg total) by mouth daily. 90 tablet 0    ROSUVASTATIN 10 MG Oral Tab Take 1 tablet (10 mg total) by mouth nightly. 30 tablet 0    LORazepam 1 MG Oral Tab Take 1 tablet (1 mg total) by mouth nightly as needed. AT BEDTIME 90 tablet 1    Esomeprazole Magnesium (NEXIUM 24HR OR) daily.      Dicyclomine HCl 20 MG Oral Tab Take 1 tablet (20 mg total) by mouth 4 (four) times daily as needed. 120 tablet 5      Counseling given: Not Answered       REVIEW OF SYSTEMS:   Review of Systems   HENT:  Positive for ear pain. Negative for congestion, ear discharge and hearing loss.    Skin:  Negative for color change, rash and wound.   Neurological:  Negative for dizziness and headaches.        EXAM:   /80   Pulse 73   Resp 17   Ht 5' 5.35\" (1.66 m)   Wt 201 lb (91.2 kg)   SpO2 99%   BMI 33.09 kg/m²  Estimated body mass index is 33.09 kg/m² as calculated from the following:    Height as of this encounter: 5' 5.35\" (1.66 m).    Weight as of this encounter: 201 lb (91.2 kg).   Vital signs reviewed.Appears stated age, well groomed.  Physical Exam  Constitutional:       Appearance: Normal appearance.   HENT:      Right Ear: External  ear normal. There is impacted cerumen.      Left Ear: External ear normal. There is impacted cerumen.      Nose: No congestion or rhinorrhea.      Mouth/Throat:      Mouth: Mucous membranes are moist.      Pharynx: Oropharynx is clear.   Eyes:      Extraocular Movements: Extraocular movements intact.      Conjunctiva/sclera: Conjunctivae normal.   Cardiovascular:      Rate and Rhythm: Normal rate and regular rhythm.   Pulmonary:      Effort: Pulmonary effort is normal.      Breath sounds: Normal breath sounds.   Skin:     General: Skin is warm and dry.      Comments: Skin of left external ear shows no erythremia, ecchymosis, abrasions.  Skin is intact and there is no deformities on the left there.   Neurological:      Mental Status: She is alert and oriented to person, place, and time.   Psychiatric:         Mood and Affect: Mood normal.         Behavior: Behavior normal.         Thought Content: Thought content normal.         Judgment: Judgment normal.        Cerumen Removal Procedure  Patient gave verbal consent.  Risks and Benefits of removal were discussed with the patient, who agreed to proceed with procedure.  Right Ear and Left Ear  Indication: TM not visible via otoscope visualization, fullness.  Cerumen removed bilaterally with warm water irrigation using a Rhino Ear Washer Removal and small basin.    Tolerated procedure well and had no complaints.    Right and Left ears was reassessed utilizing otoscope.  There is no bulging of TMs, no erythema, no discharge, no bleeding, no exudate,.  No effusion of the TM was noted.  Advised patient to not use Q-tips to clean the ears and instead use a warm washcloth with finger and gently remove the wax on the outer portion of the ear canal at home.     ASSESSMENT AND PLAN:   1. Chronic otitis externa of left ear, unspecified type  Left ear canal erythematous once impacted cerumen was removed.  Difficult to say if the ear canal is at the erythematous prior to the  cerumen being removed since it was originally on visible due to cerumen.  Refilled ofloxacin to help with inflammation of the skin and pain within the ear.  - ofloxacin 0.3 % Otic Solution; Place 5 drops into the left ear daily.  Dispense: 1 each; Refill: 4    2. Painful pinna on examination, left  Skin of left ear pinna did not show any abrasion, erythema, ecchymosis, or acute injury.  Possibly referred pain from the ear canal itself.  Medrol Dosepak prescribed since patient states he does have some pain on examination today.  - methylPREDNISolone (MEDROL) 4 MG Oral Tablet Therapy Pack; As directed.  Dispense: 1 each; Refill: 0    3. Bilateral impacted cerumen  Tolerated procedure well, advised to clean areas at home and a routine basis to help prevent buildup of cerumen.      Meds, Orders, & Refills for this Visit:  Requested Prescriptions     Signed Prescriptions Disp Refills    ofloxacin 0.3 % Otic Solution 1 each 4     Sig: Place 5 drops into the left ear daily.    methylPREDNISolone (MEDROL) 4 MG Oral Tablet Therapy Pack 1 each 0     Sig: As directed.         Imaging & Consults:  None      Health Maintenance:  Health Maintenance Due   Topic Date Due    Pneumococcal Vaccine: 50+ Years (1 of 1 - PCV) Never done    Colorectal Cancer Screening  07/20/2013    Mammogram  05/01/2015    Annual Physical  07/28/2023    DEXA Scan  02/22/2024    COVID-19 Vaccine (5 - 2024-25 season) 09/01/2024    Annual Depression Screening  01/01/2025         Problem List:  Patient Active Problem List   Diagnosis    History of cervical cancer    Anxiety    GERD (gastroesophageal reflux disease)    Thoracic or lumbosacral neuritis or radiculitis, unspecified    Degeneration of lumbar or lumbosacral intervertebral disc    Lumbago    Osteopenia    Pure hypercholesterolemia    Vitamin D deficiency    Irritable bowel syndrome with diarrhea    Obesity    Primary insomnia    Other cervical disc degeneration at C6-C7 level    Degenerative  cervical spinal stenosis    Cervical radiculitis         Patient/Caregiver Education: Patient/Caregiver Education: There are no barriers to learning. Medical education done.   Outcome: Marilyn Post verbalizes understanding, agrees with the plan, and had no other questions at the end of today's visit. Marilyn Post is informed to call with any questions, complications, allergies, or worsening or changing symptoms.  Marilyn CEDENO Elis is to call with any side effects or complications from the treatments as a result of today.     Follow-up in   Return if symptoms worsen or fail to improve.    Note to patient: The 21st Century Cures Act makes medical notes like these available to patients in the interest of transparency. However, this is a medical document intended as peer to peer communication. It is written in medical language and may contain abbreviations or verbiage that are unfamiliar. It may appear blunt or direct. Medical documents are intended to carry relevant information, facts as evident, and the clinical opinion of the practitioner.

## 2025-03-19 ENCOUNTER — TELEPHONE (OUTPATIENT)
Dept: PAIN CLINIC | Facility: CLINIC | Age: 65
End: 2025-03-19

## 2025-03-19 NOTE — TELEPHONE ENCOUNTER
Prior Authorization for left C5/6,C6/7 MBB   initiated with [facility]  CPT codes: 64985,55196  pending reference # [id number]  Clinical notes submitted via fax to [phone]

## 2025-03-25 NOTE — TELEPHONE ENCOUNTER
Letter from TIFFANIE    After review of the medical records that were sent to us we are unable to approve this for payment. In order for us to cover this request we need your doctor to send us notes that say your pain is from arthritis in your spine joint. The notes must say it is not a nerve problem (radiculitis)      P2P is a option   [phone]   Donald Smith MD

## 2025-03-31 NOTE — TELEPHONE ENCOUNTER
Prior authorization request completed for: left C5/6,C6/7 MBB  Authorization # [id number]  Authorization dates: 3/19/25-9/15/25  CPT codes approved: 79960,22453  Number of visits/dates of service approved: 1  Physician: perfecto  Location: [facility]     Patient can be scheduled. Routed to Navigator.

## 2025-04-28 RX ORDER — SERTRALINE HYDROCHLORIDE 100 MG/1
100 TABLET, FILM COATED ORAL DAILY
Qty: 90 TABLET | Refills: 0 | Status: SHIPPED | OUTPATIENT
Start: 2025-04-28

## 2025-05-01 ENCOUNTER — APPOINTMENT (OUTPATIENT)
Dept: GENERAL RADIOLOGY | Facility: HOSPITAL | Age: 65
End: 2025-05-01
Attending: ANESTHESIOLOGY
Payer: COMMERCIAL

## 2025-05-01 ENCOUNTER — HOSPITAL ENCOUNTER (OUTPATIENT)
Facility: HOSPITAL | Age: 65
Setting detail: HOSPITAL OUTPATIENT SURGERY
Discharge: HOME | End: 2025-05-01
Attending: ANESTHESIOLOGY | Admitting: ANESTHESIOLOGY
Payer: COMMERCIAL

## 2025-05-01 VITALS
RESPIRATION RATE: 18 BRPM | WEIGHT: 201 LBS | OXYGEN SATURATION: 98 % | HEIGHT: 65.35 IN | HEART RATE: 61 BPM | BODY MASS INDEX: 33.09 KG/M2 | DIASTOLIC BLOOD PRESSURE: 68 MMHG | SYSTOLIC BLOOD PRESSURE: 125 MMHG | TEMPERATURE: 98 F

## 2025-05-01 DIAGNOSIS — F51.01 PRIMARY INSOMNIA: ICD-10-CM

## 2025-05-01 PROBLEM — M47.812 CERVICAL SPONDYLOSIS: Status: ACTIVE | Noted: 2024-12-30

## 2025-05-01 PROCEDURE — 64491 INJ PARAVERT F JNT C/T 2 LEV: CPT | Performed by: ANESTHESIOLOGY

## 2025-05-01 PROCEDURE — 64490 INJ PARAVERT F JNT C/T 1 LEV: CPT | Performed by: ANESTHESIOLOGY

## 2025-05-01 RX ORDER — NALOXONE HYDROCHLORIDE 0.4 MG/ML
0.08 INJECTION, SOLUTION INTRAMUSCULAR; INTRAVENOUS; SUBCUTANEOUS AS NEEDED
Status: DISCONTINUED | OUTPATIENT
Start: 2025-05-01 | End: 2025-05-01

## 2025-05-01 RX ORDER — DEXAMETHASONE SODIUM PHOSPHATE 10 MG/ML
INJECTION, SOLUTION INTRAMUSCULAR; INTRAVENOUS
Status: DISCONTINUED | OUTPATIENT
Start: 2025-05-01 | End: 2025-05-01

## 2025-05-01 RX ORDER — LIDOCAINE HYDROCHLORIDE 10 MG/ML
INJECTION, SOLUTION EPIDURAL; INFILTRATION; INTRACAUDAL; PERINEURAL
Status: DISCONTINUED | OUTPATIENT
Start: 2025-05-01 | End: 2025-05-01

## 2025-05-01 RX ORDER — SODIUM CHLORIDE 9 MG/ML
INJECTION, SOLUTION INTRAMUSCULAR; INTRAVENOUS; SUBCUTANEOUS
Status: DISCONTINUED | OUTPATIENT
Start: 2025-05-01 | End: 2025-05-01

## 2025-05-01 NOTE — DISCHARGE INSTRUCTIONS
Home Care Instructions Following Your Pain Procedure     Marilyn,  It has been a pleasure to have you as our patient. To help you at home, you must follow these general discharge instructions. We will review these with you before you are discharged. It is our hope that you have a complete and uneventful recovery from our procedure.     General Instructions:  What to Expect:  Bandages from your procedure today can be removed when you get home.  Please avoid soaking and/or swimming for 24 hours.  Showering is okay  It is normal to have increased pain symptoms and/or pain at injection site for up to 3-5 days after procedure, you can use heat or ice (20 minutes on 20 minutes off) for comfort.  You may experience some temporary side effects which may include restlessness or insomnia, flushing of the face, or heart palpitations.  Please contact the provider if these symptoms do not resolve within 3-4 days.  Lightheadedness or nausea may occur and should resolve within 24 to 48 hours.  If you develop a headache after treatment, rest, drink fluids (with caffeine, if possible) and take mild over-the-counter pain medication.  If the headache does not improve with the above treatment, contact the physician.  Home Medications:  Resume all previously prescribed medication.  Please avoid taking NSAIDs (Non-Steriodal Anti-Inflammatory Drugs) such as:  Ibuprofen ( Advil, Motrin) Aleve (Naproxen), Diclofenac, Meloxicam for 6 hours after procedure.   If you are on Coumadin (Warfarin) or any other anti-coagulant (or \"blood thinning\") medication such as Plavix (Clopidogrel), Xarelto (Rivaroxaban), Eliquis (Apixaban), Effient (Prasugrel) etc., restart on the following day from the procedure unless otherwise directed by your provider.  If you are a diabetic, please increase the frequency of your glucose monitoring after the procedure as steroids may cause a temporary (2-3 day) increase in your blood sugar.  Contact your primary care  physician if your blood sugar remains elevated as you may require some medication adjustment.  Diet:  Resume your regular diet as tolerated.  Activity:  We recommend that you relax and rest during the rest of your procedure day.  If you feel weakness in your arms or legs do not drive.  Follow-up Appointment  Please schedule a follow-up visit within 3 to 4 weeks after your last procedure date.  Question or Concerns:  Feel free to call our office with any questions or concerns at [phone] (option #2)    Marilyn  Thank you for coming to [facility] for your procedure.  The nurses try very hard to make sure you receive the best care possible.  Your trust in them as well as us is greatly appreciated.    Thanks so much,   Dr. Casper Marquez

## 2025-05-01 NOTE — H&P
History & Physical Examination    Patient Name: Marilyn Post  MRN: [id number]  CSN: [id number]  YOB: 1960    Pre-Operative Diagnosis:  Cervical spondylosis [M47.812]    Present Illness: Cervical spondylosis    ASA: 2  MP class: 1  Sedation: Local      Prescriptions Prior to Admission[1]  Current Hospital Medications[2]    Allergies: Allergies[3]    Past Medical History[4]  Past Surgical History[5]  Family History[6]  Social History     Tobacco Use    Smoking status: Never     Passive exposure: Yes    Smokeless tobacco: Never   Substance Use Topics    Alcohol use: Yes     Alcohol/week: 2.0 standard drinks of alcohol     Types: 1 Glasses of wine, 1 Cans of beer per week     Comment: Socially       SYSTEM Check if Review is Normal Check if Physical Exam is Normal If not normal, please explain:   HEENT [x ] [x ]    NECK & BACK [x ] [x ]    HEART [x ] [x ]    LUNGS [x ] [x ]    ABDOMEN [x ] [x ]    UROGENITAL [x ] [x ]    EXTREMITIES [x ] [x ]    OTHER        [ x ] I have discussed the risks and benefits and alternatives with the patient/family.  They understand and agree to proceed with plan of care.  [ x ] I have reviewed the History and Physical done within the last 30 days.  Any changes noted above.    Casper Marquez MD              [1]   Medications Prior to Admission   Medication Sig Dispense Refill Last Dose/Taking    SERTRALINE 100 MG Oral Tab Take 1 tablet (100 mg total) by mouth daily. 90 tablet 0     ofloxacin 0.3 % Otic Solution Place 5 drops into the left ear daily. 1 each 4     ROSUVASTATIN 10 MG Oral Tab Take 1 tablet (10 mg total) by mouth nightly. 30 tablet 0     LORazepam 1 MG Oral Tab Take 1 tablet (1 mg total) by mouth nightly as needed. AT BEDTIME 90 tablet 1    [2]   No current facility-administered medications for this encounter.   [3]   Allergies  Allergen Reactions    Erythromycin OTHER (SEE COMMENTS)    Benadryl [Altaryl] RASH    Clindamycin RASH    Demerol HALLUCINATION    [4]   Past Medical History:   Anxiety state, unspecified    Cancer (HCC)    Cervical    Cancer of cervix (HCC)    Depression    Mom     Esophageal reflux    Hyperlipidemia    Obesity   [5]   Past Surgical History:  Procedure Laterality Date    Cholecystectomy      Colonoscopy      D & c  ,    Missed AB          x 3    Other surgical history  11    implantation of cervical sleeve for high dose brachytherapy    Removal of tonsils,11+ y/o  1971    Tonsillectomy     [6]   Family History  Problem Relation Age of Onset    Hypertension Mother     Cancer Father         Larynx

## 2025-05-01 NOTE — OPERATIVE REPORT
[facility]  Operative Report  2025     Marilyn Post Patient Status:  Hospital Outpatient Surgery    1960 MRN [id number]   Location [facility] Attending Casper Marquez MD   Hosp Day # 0 PCP Suzy Pastor DO     Indication: Marilyn is a 65 year old female with cervical spondylosis    Preoperative Diagnosis:  Cervical spondylosis [M47.812]    Postoperative Diagnosis: Same as above.    Procedure performed: Left C5, C6 MEDIAL BRANCH BLOCK with local    Anesthesia: Local      EBL: Less than 1 ml.    Procedure Description:  After reviewing the patient's history and performing a focused physical examination, the diagnosis was confirmed and contraindications such as infection and coagulopathy were ruled out.  Following review of allergy, potential side effects and complications, including but not necessarily limited to infection, allergic reaction, local tissue breakdown, nerve injury, and paresis, the patient indicated they understood and agreed to proceed.  After obtaining the informed consent, the patient was brought to the procedure room and monitored.      In the prone position, following sterile prep and drape of the cervical region, the articular pillars of the corresponding facet joints were identified under fluoroscopy.  The skin and subcutaneous tissue were anesthetized via 25-gauge 1-1/2 inch needle with approximately 1 mL of 1% lidocaine.  Under fluoroscopy, posterolateral approach was used to position a 22-gauge, 3-1/2-inch spinal needle adjacent to the mid portion of the corresponding articular pillar at C4.  The needle position was confirmed in AP and lateral views.  Following negative aspiration for CSF and blood, 0.5 mL 1% lidocaine was injected.  Similar procedures were performed at C5 and C6 levels.  The needles were removed with tips intact.  The patient tolerated the procedure very well.  No complications were encountered during or immediately after  the procedure.  The patient was observed until discharge criteria met.  Discharge instructions were given and patient was released to a responsible adult.      Complications: None.    Follow up:  Clinic    Casper Marquez MD

## 2025-05-02 DIAGNOSIS — F51.01 PRIMARY INSOMNIA: ICD-10-CM

## 2025-05-09 NOTE — TELEPHONE ENCOUNTER
See messages below.    Patient states she has not switched PCPs, she was seen at [facility] twice.    Ok to refill Lorazepam?

## 2025-05-10 RX ORDER — LORAZEPAM 1 MG/1
1 TABLET ORAL NIGHTLY PRN
Qty: 90 TABLET | Refills: 1 | Status: SHIPPED | OUTPATIENT
Start: 2025-05-10

## 2025-05-12 RX ORDER — LORAZEPAM 1 MG/1
1 TABLET ORAL NIGHTLY PRN
Qty: 90 TABLET | Refills: 0 | OUTPATIENT
Start: 2025-05-12

## 2025-05-16 ENCOUNTER — OFFICE VISIT (OUTPATIENT)
Dept: PAIN CLINIC | Facility: CLINIC | Age: 65
End: 2025-05-16
Payer: COMMERCIAL

## 2025-05-16 VITALS
SYSTOLIC BLOOD PRESSURE: 118 MMHG | HEART RATE: 72 BPM | BODY MASS INDEX: 33 KG/M2 | DIASTOLIC BLOOD PRESSURE: 72 MMHG | OXYGEN SATURATION: 97 % | WEIGHT: 201 LBS

## 2025-05-16 DIAGNOSIS — M47.812 CERVICAL SPONDYLOSIS: Primary | ICD-10-CM

## 2025-05-16 PROCEDURE — 3078F DIAST BP <80 MM HG: CPT | Performed by: ANESTHESIOLOGY

## 2025-05-16 PROCEDURE — 3074F SYST BP LT 130 MM HG: CPT | Performed by: ANESTHESIOLOGY

## 2025-05-16 PROCEDURE — 99214 OFFICE O/P EST MOD 30 MIN: CPT | Performed by: ANESTHESIOLOGY

## 2025-05-16 PROCEDURE — G2211 COMPLEX E/M VISIT ADD ON: HCPCS | Performed by: ANESTHESIOLOGY

## 2025-05-16 RX ORDER — CELECOXIB 100 MG/1
100 CAPSULE ORAL 2 TIMES DAILY
Qty: 60 CAPSULE | Refills: 0 | Status: SHIPPED | OUTPATIENT
Start: 2025-05-16

## 2025-05-16 NOTE — PROGRESS NOTES
Name: Marilyn Post   : 1960   DOS: 2025     Pain Clinic Follow Up Visit:     Chief Complaint   Patient presents with    Procedure Follow Up     Left C5, C6 MEDIAL BRANCH BLOCK with local       Marilyn Post is a 65 year old female with a history of cervical spondylosis.  The patient status post cervical epidural steroid injection and cervical medial branch block without significant improvement.  Still complains of left-sided neck pain which is made worse with lateral rotation.  Rates his pain as 4 out of 10.      REVIEW OF SYSTEMS:  A ten point review of systems was performed with pertinent positives and negatives in the HPI.    Allergies[1]    Current Medications[2]      EXAM:   /72   Pulse 72   Wt 201 lb (91.2 kg)   SpO2 97%   BMI 33.09 kg/m²   General:  Patient is a(n) 65 year old year old female in no acute distress.  Neurologic:: WNL-Orientation to time, place and person, normal mood & affect, concentration & attention span intact.   Inspection:  Ambulates with well-coordinated, fluid, non-antalgic gait.  Gait is normal.  Neck: Injection site clear.  Does have difficulty with lateral rotation towards the left  Cranial nerve: Grossly intact  Back: Gait intact  Respiratory: Speech is nonlabored    IMAGES:      cervical MRI reviewed evidence of multilevel degenerative changes with severe bilateral neuroforaminal stenosis due to disc osteophyte complex    ASSESSMENT AND PLAN:     1. Cervical spondylosis        The patient is a pleasant 65-year-old with a history of neck pain.  This has been treated with epidural injection and medial branch block.  Unfortunately, did not have much relief.  Recommend surgical referral.  Additionally, did provide Celebrex.    Pain is  limiting functional status. Failed conservative treatment consisting of medications, activity modification, and  PT.  1.Risks of long term narcotic use d/w pt including dependence, abuse, and death from overdose and  mixing narcotic with alcohol. Pt verbalized understanding.     Medications filled today:  Requested Prescriptions     Signed Prescriptions Disp Refills    celecoxib 100 MG Oral Cap 60 capsule 0     Sig: Take 1 capsule (100 mg total) by mouth 2 (two) times daily.     Orders:No orders of the defined types were placed in this encounter.        Radiology orders and consultations:NEUROSURGERY - INTERNAL  The patient indicates understanding of these issues and agrees to the plan.  No follow-ups on file.    Casper Marquez MD, 5/16/2025, 10:12 AM              [1]   Allergies  Allergen Reactions    Erythromycin OTHER (SEE COMMENTS)    Benadryl [Altaryl] RASH    Clindamycin RASH    Demerol HALLUCINATION   [2]   Current Outpatient Medications   Medication Sig Dispense Refill    celecoxib 100 MG Oral Cap Take 1 capsule (100 mg total) by mouth 2 (two) times daily. 60 capsule 0    LORazepam 1 MG Oral Tab Take 1 tablet (1 mg total) by mouth nightly as needed. AT BEDTIME 90 tablet 1    SERTRALINE 100 MG Oral Tab Take 1 tablet (100 mg total) by mouth daily. 90 tablet 0    ofloxacin 0.3 % Otic Solution Place 5 drops into the left ear daily. 1 each 4    ROSUVASTATIN 10 MG Oral Tab Take 1 tablet (10 mg total) by mouth nightly. 30 tablet 0

## 2025-05-16 NOTE — PATIENT INSTRUCTIONS
Refill policies:    Allow 2-3 business days for refills; controlled substances may take longer.  Contact your pharmacy at least 5 days prior to running out of medication and have them send an electronic request or submit request through the “request refill” option in your [employer] account.  Refills are not addressed on weekends; covering physicians do not authorize routine medications on weekends.  No narcotics or controlled substances are refilled after noon on Fridays or by on call physicians.  By law, narcotics must be electronically prescribed.  A 30 day supply with no refills is the maximum allowed.  If your prescription is due for a refill, you may be due for a follow up appointment.  To best provide you care, patients receiving routine medications need to be seen at least once a year.  Patients receiving narcotic/controlled substance medications need to be seen at least once every 3 months.  In the event that your preferred pharmacy does not have the requested medication in stock (e.g. Backordered), it is your responsibility to find another pharmacy that has the requested medication available.  We will gladly send a new prescription to that pharmacy at your request.    Scheduling Tests:    If your physician has ordered radiology tests such as MRI or CT scans, please contact Central Scheduling at [phone] right away to schedule the test.  Once scheduled, the [facility] Centralized Referral Team will work with your insurance carrier to obtain pre-certification or prior authorization.  Depending on your insurance carrier, approval may take 3-10 days.  It is highly recommended patients assure they have received an authorization before having a test performed.  If test is done without insurance authorization, patient may be responsible for the entire amount billed.      Precertification and Prior Authorizations:  If your physician has recommended that you have a procedure or additional testing performed the [facility]  Centralized Referral Team will contact your insurance carrier to obtain pre-certification or prior authorization.    You are strongly encouraged to contact your insurance carrier to verify that your procedure/test has been approved and is a COVERED benefit.  Although the [facility] Centralized Referral Team does its due diligence, the insurance carrier gives the disclaimer that \"Although the procedure is authorized, this does not guarantee payment.\"    Ultimately the patient is responsible for payment.   Thank you for your understanding in this matter.  Paperwork Completion:  If you require FMLA or disability paperwork for your recovery, please make sure to either drop it off or have it faxed to our office at [phone]. Be sure the form has your name and date of birth on it.  The form will be faxed to our Forms Department and they will complete it for you.  There is a 25$ fee for all forms that need to be filled out.  Please be aware there is a 10-14 day turnaround time.  You will need to sign a release of information (LUZ MARINA) form if your paperwork does not come with one.  You may call the Forms Department with any questions at [phone].  Their fax number is [phone].

## 2025-05-16 NOTE — PROGRESS NOTES
Last procedure: Left C5, C6 MEDIAL BRANCH BLOCK with local   Date: 05/01/25  Percentage of relief obtained: 20%  Duration of relief: current    Current Pain Score: 4

## 2025-05-18 ENCOUNTER — TELEPHONE (OUTPATIENT)
Age: 65
End: 2025-05-18

## 2025-05-21 ENCOUNTER — TELEPHONE (OUTPATIENT)
Age: 65
End: 2025-05-21

## 2025-05-21 NOTE — TELEPHONE ENCOUNTER
Hello!    The [facility] behavioral health navigation team has attempted to reach you regarding a referral to connect you with resources. Below is a list of behavioral health providers who are showing as in network with your insurance and accepting new patients. Please reach out to the providers directly to schedule an appointment.    PERLA Cooper MD  [facility]  [address]  [place] [address]  Phone: [phone]    HELEN Nelson  [facility]  [address]  [place] [facility] [place] [address]  Phone: [phone]    [facility] Diaz [facility]  MD Lee Ann Dodd APN   [facility]  [address]  [place] [address]  Phone: [phone]    Jayashree Willett PMARTURP  [facility]  [address]  [place] [address]  Phone: [phone]    If you have any further questions, please contact my team at [phone].    Thank you,    Sharonda Hunter, MSW, LSW  Behavioral Health Navigator  [phone]

## 2025-06-10 RX ORDER — CELECOXIB 100 MG/1
100 CAPSULE ORAL 2 TIMES DAILY
Qty: 60 CAPSULE | Refills: 0 | Status: SHIPPED | OUTPATIENT
Start: 2025-06-10

## 2025-06-10 NOTE — TELEPHONE ENCOUNTER
Medication: celecoxib 100 MG     [place]16/25    Last office visit: 05/16/25  Due back to clinic per last office note:  na  Date next office visit scheduled:  none        Last OV note recommendation:   ASSESSMENT AND PLAN:      1. Cervical spondylosis          The patient is a pleasant 65-year-old with a history of neck pain.  This has been treated with epidural injection and medial branch block.  Unfortunately, did not have much relief.  Recommend surgical referral.  Additionally, did provide Celebrex.     Pain is  limiting functional status. Failed conservative treatment consisting of medications, activity modification, and  PT.  1.Risks of long term narcotic use d/w pt including dependence, abuse, and death from overdose and mixing narcotic with alcohol. Pt verbalized understanding.

## 2025-07-14 RX ORDER — CELECOXIB 100 MG/1
100 CAPSULE ORAL 2 TIMES DAILY
Qty: 60 CAPSULE | Refills: 0 | Status: SHIPPED | OUTPATIENT
Start: 2025-07-14

## 2025-07-14 NOTE — TELEPHONE ENCOUNTER
Medication: CELECOXIB 100 MG     [place]10[place]25    Last office visit: 5/16/25  Due back to clinic per last office note:  n/a  Date next office visit scheduled:  None on file      Last URINE Screen: n/a      Narcotic Contract EXPIRATION date: n/a    Last OV note recommendation: The patient is a pleasant 65-year-old with a history of neck pain.  This has been treated with epidural injection and medial branch block.  Unfortunately, did not have much relief.  Recommend surgical referral.  Additionally, did provide Celebrex.

## 2025-07-15 ENCOUNTER — HOSPITAL ENCOUNTER (OUTPATIENT)
Age: 65
Discharge: HOME | End: 2025-07-15
Payer: COMMERCIAL

## 2025-07-15 VITALS
BODY MASS INDEX: 31.82 KG/M2 | SYSTOLIC BLOOD PRESSURE: 155 MMHG | HEART RATE: 80 BPM | WEIGHT: 198 LBS | DIASTOLIC BLOOD PRESSURE: 89 MMHG | OXYGEN SATURATION: 96 % | TEMPERATURE: 98 F | HEIGHT: 66 IN | RESPIRATION RATE: 18 BRPM

## 2025-07-15 DIAGNOSIS — E78.00 PURE HYPERCHOLESTEROLEMIA: ICD-10-CM

## 2025-07-15 DIAGNOSIS — N30.01 ACUTE CYSTITIS WITH HEMATURIA: Primary | ICD-10-CM

## 2025-07-15 LAB
BILIRUB UR QL STRIP: NEGATIVE
GLUCOSE UR STRIP-MCNC: NEGATIVE MG/DL
KETONES UR STRIP-MCNC: NEGATIVE MG/DL
NITRITE UR QL STRIP: POSITIVE
PH UR STRIP: 5.5 [PH]
PROT UR STRIP-MCNC: 30 MG/DL
SP GR UR STRIP: >=1.03
UROBILINOGEN UR STRIP-ACNC: <2 MG/DL

## 2025-07-15 PROCEDURE — 99214 OFFICE O/P EST MOD 30 MIN: CPT

## 2025-07-15 PROCEDURE — 99213 OFFICE O/P EST LOW 20 MIN: CPT

## 2025-07-15 PROCEDURE — 87086 URINE CULTURE/COLONY COUNT: CPT | Performed by: PHYSICIAN ASSISTANT

## 2025-07-15 PROCEDURE — 87088 URINE BACTERIA CULTURE: CPT | Performed by: PHYSICIAN ASSISTANT

## 2025-07-15 PROCEDURE — 81002 URINALYSIS NONAUTO W/O SCOPE: CPT

## 2025-07-15 PROCEDURE — 87186 SC STD MICRODIL/AGAR DIL: CPT | Performed by: PHYSICIAN ASSISTANT

## 2025-07-15 RX ORDER — CEPHALEXIN 500 MG/1
500 CAPSULE ORAL 4 TIMES DAILY
Qty: 28 CAPSULE | Refills: 0 | Status: SHIPPED | OUTPATIENT
Start: 2025-07-15 | End: 2025-07-15

## 2025-07-15 RX ORDER — CEPHALEXIN 500 MG/1
500 CAPSULE ORAL 2 TIMES DAILY
Qty: 14 CAPSULE | Refills: 0 | Status: SHIPPED | OUTPATIENT
Start: 2025-07-15 | End: 2025-07-22

## 2025-07-15 NOTE — ED PROVIDER NOTES
Patient Seen in: [facility]        History  Chief Complaint   Patient presents with    Urinary Symptoms     Stated Complaint: UTI    Subjective:   HPI          65-year-old female with past medical history as below presents to immediate care for evaluation of urinary urgency and frequency for the past 4 to 5 days.  She denies dysuria, gross hematuria, abdominal or flank pain, fever/chills.        Objective:     Past Medical History:    Anxiety state, unspecified    Cancer (HCC)    Cervical    Cancer of cervix (HCC)    Depression    Mom     Esophageal reflux    Hyperlipidemia    Obesity              Past Surgical History:   Procedure Laterality Date    Cholecystectomy      Colonoscopy  2013    D & c  ,    Missed AB          x 3    Other surgical history  12    implantation of cervical sleeve for high dose brachytherapy    Removal of tonsils,11+ y/o  1971    Tonsillectomy                  Social History     Socioeconomic History    Marital status:    Tobacco Use    Smoking status: Never     Passive exposure: Yes    Smokeless tobacco: Never   Vaping Use    Vaping status: Never Used   Substance and Sexual Activity    Alcohol use: Yes     Alcohol/week: 2.0 standard drinks of alcohol     Types: 1 Glasses of wine, 1 Cans of beer per week     Comment: Socially    Drug use: No    Sexual activity: Yes     Partners: Male   Other Topics Concern    Caffeine Concern No    Exercise Yes    Seat Belt Yes    Special Diet No    Stress Concern Yes     Comment: grandson epilepsy  work mom passed my best friend my house f    Weight Concern Yes     Social Drivers of Health     Food Insecurity: No Food Insecurity (2025)    NCSS - Food Insecurity     Worried About Running Out of Food in the Last Year: No     Ran Out of Food in the Last Year: No   Transportation Needs: No Transportation Needs (2025)    NCSS - Transportation     Lack of Transportation: No   Housing Stability:  Not At Risk (4/22/2025)    NCSS - Housing/Utilities     Has Housing: Yes     Worried About Losing Housing: No     Unable to Get Utilities: No              Review of Systems    Positive for stated complaint: UTI  Other systems are as noted in HPI.  Constitutional and vital signs reviewed.      All other systems reviewed and negative except as noted above.                  Physical Exam    ED Triage Vitals [07/15/25 1650]   /89   Pulse 80   Resp 18   Temp 97.5 °F (36.4 °C)   Temp src Oral   SpO2 96 %   O2 Device None (Room air)       Current Vitals:   Vital Signs  BP: 155/89  Pulse: 80  Resp: 18  Temp: 97.5 °F (36.4 °C)  Temp src: Oral    Oxygen Therapy  SpO2: 96 %  O2 Device: None (Room air)          Physical Exam  Vitals and nursing note reviewed.   Constitutional:       General: She is not in acute distress.     Appearance: Normal appearance. She is not ill-appearing, toxic-appearing or diaphoretic.   Cardiovascular:      Rate and Rhythm: Normal rate.   Pulmonary:      Effort: Pulmonary effort is normal. No respiratory distress.   Abdominal:      Palpations: Abdomen is soft.      Tenderness: There is no abdominal tenderness. There is no right CVA tenderness, left CVA tenderness or guarding.   Neurological:      Mental Status: She is alert and oriented to person, place, and time.   Psychiatric:         Behavior: Behavior normal.           ED Course  Labs Reviewed   [facility] POCT URINALYSIS DIPSTICK - Abnormal; Notable for the following components:       Result Value    Urine Clarity Slightly cloudy (*)     Protein urine 30 (*)     Blood, Urine Trace-Intact (*)     Nitrite Urine Positive (*)     Leukocyte esterase urine Small (*)     All other components within normal limits   URINE CULTURE, ROUTINE          MDM     Differential diagnosis considered but not limited to acute cystitis, pyelonephritis, nephrolithiasis    Afebrile. Abdomen is soft and nontender.  No CVA tenderness bilaterally.  UA with small leukocyte  esterase, positive nitrites, trace blood.  Keflex prescribed.  Urine bacterial culture pending.        Medical Decision Making  Amount and/or Complexity of Data Reviewed  Labs: ordered. Decision-making details documented in ED Course.    Risk  Prescription drug management.        Disposition and Plan     Clinical Impression:  1. Acute cystitis with hematuria         Disposition:  Discharge  7/15/2025  5:21 pm    Follow-up:  [facility]  [address]  [facility] [id number] [phone]    If symptoms worsen          Medications Prescribed:          Supplementary Documentation:

## 2025-08-01 RX ORDER — ROSUVASTATIN CALCIUM 10 MG/1
10 TABLET, COATED ORAL NIGHTLY
Qty: 30 TABLET | Refills: 0 | OUTPATIENT
Start: 2025-08-01

## 2025-08-06 RX ORDER — SERTRALINE HYDROCHLORIDE 100 MG/1
100 TABLET, FILM COATED ORAL DAILY
Qty: 90 TABLET | Refills: 0 | OUTPATIENT
Start: 2025-08-06

## 2025-08-11 RX ORDER — CELECOXIB 100 MG/1
100 CAPSULE ORAL 2 TIMES DAILY
Qty: 60 CAPSULE | Refills: 0 | Status: SHIPPED | OUTPATIENT
Start: 2025-08-11

## (undated) DEVICE — NEEDLE SPNL 25GA L3.5IN BLU QNCKE STYL DISP

## (undated) DEVICE — SYRINGE 10ML SLIP TIP LOSS OF RESIST PLAS

## (undated) DEVICE — SKIN REG/FINE DUAL MARKER, RULER, LABELS: Brand: MEDLINE

## (undated) DEVICE — GLOVE SUR 6.5 SENSICARE PIP WHT PWD F

## (undated) DEVICE — PAIN TRAY: Brand: MEDLINE INDUSTRIES, INC.

## (undated) DEVICE — REMOVER LOT 4OZ N IRRIG UNSCNT SFT MOIST LIQ

## (undated) DEVICE — GLOVE,SURG,SENSICARE,ALOE,LF,PF,7: Brand: MEDLINE

## (undated) DEVICE — AVANOS* TUOHY EPIDURAL NEEDLE: Brand: AVANOS

## (undated) DEVICE — GLOVE SUR 7.5 SENSICARE PIP WHT PWD F

## (undated) DEVICE — BANDAGE ADH 1INX3IN NAT FAB N ADH PD CURAD

## (undated) NOTE — MR AVS SNAPSHOT
After Visit Summary   8/21/2019    Nasra Haynes    MRN: [id number]           Visit Information     Date & Time  8/21/2019  1:15 PM Provider  MD Chelsea B[address] [address] in [place] [address].  Phone  [phone] 2/19/2020 1:15 PM Serena [facility] in [place]      Follow-up Instructions    Return in about 6 months (around 2/21/2020).          August 21, 2019      Jamie Barker [address] [facility] now offers Video Visits through [employer] for adult and pediatric patients. Video Visits are available Monday - Friday for many common conditions such as allergies, colds, cough, fever, rash, sore throat, headache and pink eye.   The cost for a Video Vi at a hospital emergency room. Average cost  $2,300*   *Cost varies based on your insurance coverage  For more information about hours, locations or appointment options available at [facility],  visit: [email or web address] or call [email or web address]. (1

## (undated) NOTE — MR AVS SNAPSHOT
After Visit Summary   1/15/2020    Chip Hassan    MRN: [id number]           Visit Information     Date & Time  1/15/2020  1:45 PM Provider  MD Chelsea [place] [address] [address] in [place] [address].  Phone  [phone] Dear Aston Esteves : Thank you for enrolling in [address] Please follow the instructions below to securely access your online medical record. [employer] allows you to send messages to your doctor, view test results, renew prescriptions and request appointments. If you receive a survey from [employer], please take a few minutes to complete it and provide feedback. We strive to deliver the best patient experience and are looking for ways to make improvements. Your feedback will help us do so.  For more information EMERGENCY ROOM         Life-threatening emergencies needing immediate intervention at a hospital emergency room.     Average cost  $2,300*   *Cost varies based on your insurance coverage  For more information about hours, locations or appointment options av

## (undated) NOTE — ED AVS SNAPSHOT
Sarah Nunn   MRN: [id number]    Department:  [facility] Emergency Department in [place]   Date of Visit:  3/20/2019           Disclosure     Insurance plans vary and the physician(s) referred by the ER may not be covered by your plan.  Please con tell this physician (or your personal doctor if your instructions are to return to your personal doctor) about any new or lasting problems. The primary care or specialist physician will see patients referred from the [facility] Emergency Department.  Arthor Bernheim

## (undated) NOTE — MR AVS SNAPSHOT
After Visit Summary   1/18/2017    Erik Farrell    MRN: [id number]           Diagnoses this Visit     Malignant neoplasm of cervix, unspecified site    -  Primary       Allergies     Benadryl [Altaryl] Rash    Clindamycin Rash    Demerol Nehemiasu

## (undated) NOTE — MR AVS SNAPSHOT
After Visit Summary   12/22/2021    Woody Jennings   MRN: [id number]           Visit Information     Date & Time  12/22/2021  8:00 AM Provider  MD Chelsea Mag[place] [place] [address] in [place] [address].  Phone  [phone] allergies, back pain and cold symptoms? Skip the drive and waiting room and online chat with a doctor face-to-face using your web-cam enabled computer or mobile device wherever you are.  Video Visits cost $50 and can be paid hassle-free using a credit, leatha